# Patient Record
Sex: FEMALE | Race: ASIAN | NOT HISPANIC OR LATINO | Employment: UNEMPLOYED
[De-identification: names, ages, dates, MRNs, and addresses within clinical notes are randomized per-mention and may not be internally consistent; named-entity substitution may affect disease eponyms.]

---

## 2017-01-10 DIAGNOSIS — J45.30 MILD PERSISTENT ASTHMA WITHOUT COMPLICATION: Primary | ICD-10-CM

## 2017-01-10 NOTE — TELEPHONE ENCOUNTER
[facility] phone call message- patient requesting a refill:    Full Medication Name: albuterol    Dose: 108 (90 base)    Pharmacy confirmed as   [id number] - [place], [place] - [address] [address]  Phone: [phone] Fax: [phone]    [id number] - [place], [place] - [address] [place] [address]  Phone: [phone] Fax: [phone]  : Yes    Additional Comments: Patient needs a refill on this.      OK to leave a message on voice mail? Yes    Primary language: English      needed? No    Call taken on January 10, 2017 at 10:56 AM by Kalani Patel

## 2017-01-11 RX ORDER — ALBUTEROL SULFATE 90 UG/1
2 AEROSOL, METERED RESPIRATORY (INHALATION) EVERY 4 HOURS PRN
Qty: 2 INHALER | Refills: 0 | Status: SHIPPED
Start: 2017-01-11 | End: 2019-01-16

## 2018-04-29 ENCOUNTER — HEALTH MAINTENANCE LETTER (OUTPATIENT)
Age: 28
End: 2018-04-29

## 2019-01-16 ENCOUNTER — OFFICE VISIT (OUTPATIENT)
Dept: FAMILY MEDICINE | Facility: CLINIC | Age: 29
End: 2019-01-16
Payer: COMMERCIAL

## 2019-01-16 VITALS
WEIGHT: 150 LBS | SYSTOLIC BLOOD PRESSURE: 125 MMHG | RESPIRATION RATE: 18 BRPM | TEMPERATURE: 98.4 F | BODY MASS INDEX: 30.24 KG/M2 | HEIGHT: 59 IN | OXYGEN SATURATION: 99 % | DIASTOLIC BLOOD PRESSURE: 80 MMHG | HEART RATE: 62 BPM

## 2019-01-16 DIAGNOSIS — J45.30 MILD PERSISTENT ASTHMA WITHOUT COMPLICATION: ICD-10-CM

## 2019-01-16 DIAGNOSIS — R30.0 DYSURIA: Primary | ICD-10-CM

## 2019-01-16 DIAGNOSIS — J45.41 MODERATE PERSISTENT ASTHMA WITH ACUTE EXACERBATION: ICD-10-CM

## 2019-01-16 DIAGNOSIS — J98.01 BRONCHOSPASM: ICD-10-CM

## 2019-01-16 LAB
BACTERIA: NORMAL
BILIRUBIN UR: NEGATIVE
BLOOD UR: ABNORMAL
CASTS: NORMAL /LPF
CLARITY, URINE: CLEAR
COLOR UR: YELLOW
CRYSTAL URINE: NORMAL /LPF
EPITHELIAL CELLS UR: NORMAL /LPF (ref 0–2)
GLUCOSE URINE: NEGATIVE
KETONES UR QL: ABNORMAL
LEUKOCYTE ESTERASE UR: ABNORMAL
MUCOUS URINE: NORMAL LPF
NITRITE UR QL STRIP: NEGATIVE
PH UR STRIP: 6 [PH] (ref 5–7)
PROTEIN UR: NEGATIVE
RBC URINE: NORMAL /HPF
SP GR UR STRIP: 1.01
UROBILINOGEN UR STRIP-ACNC: ABNORMAL
WBC URINE: NORMAL /HPF

## 2019-01-16 RX ORDER — ALBUTEROL SULFATE 0.83 MG/ML
2.5 SOLUTION RESPIRATORY (INHALATION) EVERY 4 HOURS PRN
Qty: 360 ML | Refills: 1 | Status: SHIPPED | OUTPATIENT
Start: 2019-01-16 | End: 2019-03-19

## 2019-01-16 RX ORDER — NITROFURANTOIN 25; 75 MG/1; MG/1
100 CAPSULE ORAL 2 TIMES DAILY
Qty: 14 CAPSULE | Refills: 0 | Status: SHIPPED | OUTPATIENT
Start: 2019-01-16 | End: 2019-03-19

## 2019-01-16 RX ORDER — ALBUTEROL SULFATE 90 UG/1
2 AEROSOL, METERED RESPIRATORY (INHALATION) EVERY 4 HOURS PRN
Qty: 2 INHALER | Refills: 0 | Status: SHIPPED | OUTPATIENT
Start: 2019-01-16 | End: 2019-05-10

## 2019-01-16 SDOH — HEALTH STABILITY: MENTAL HEALTH: HOW OFTEN DO YOU HAVE 6 OR MORE DRINKS ON ONE OCCASION?: LESS THAN MONTHLY

## 2019-01-16 SDOH — HEALTH STABILITY: MENTAL HEALTH: HOW MANY STANDARD DRINKS CONTAINING ALCOHOL DO YOU HAVE ON A TYPICAL DAY?: 1 OR 2

## 2019-01-16 SDOH — HEALTH STABILITY: MENTAL HEALTH: HOW OFTEN DO YOU HAVE A DRINK CONTAINING ALCOHOL?: 2-4 TIMES A MONTH

## 2019-01-16 ASSESSMENT — MIFFLIN-ST. JEOR: SCORE: 1323.15

## 2019-01-16 NOTE — PROGRESS NOTES
HPI:     Piero Villarreal is a 28 year old  female with PMH of moderate persistent asthma who presents with urinary frequency.     Piero Villarreal is here on her own.     Urinary symptoms  Frequent urination x 1 week  Mild dysuria  Cramping pain on both sides of lower abdomen.  Back pain in the low back  No fever, eating and drinking normally  Maybe brown color discharge but that was right after period finished.   LMP Jan 3rd, normal.      Due for asthma follow up:  Controller medication: None currently. Previously prescribed qvar 40 1 puff BID  Albuterol use: ran out.   Triggers: Cold, cats, mold, pollen  Recent ED visits/hospital stays: none  Smokes 3 cigarettes per day. Doesn't think she's ready to quit  Previously used nicotine lozenges to quit- didn't work.   Is not interested in quit plan.   ACT score is 16 today           PMHX:     Patient Active Problem List   Diagnosis     Mild persistent asthma     Pap smear for cervical cancer screening     Prior pregnancy with congenital cardiac defect, antepartum       Current Outpatient Medications   Medication Sig Dispense Refill     albuterol (PROAIR HFA/PROVENTIL HFA/VENTOLIN HFA) 108 (90 Base) MCG/ACT inhaler Inhale 2 puffs into the lungs every 4 hours as needed for shortness of breath / dyspnea or wheezing 2 Inhaler 0     albuterol (PROVENTIL) (2.5 MG/3ML) 0.083% neb solution Take 1 vial (2.5 mg) by nebulization every 4 hours as needed for shortness of breath / dyspnea or wheezing 360 mL 1     beclomethasone HFA (QVAR REDIHALER) 40 MCG/ACT inhaler Inhale 1 puff into the lungs 2 times daily 10.6 g 1     nitroFURantoin macrocrystal-monohydrate (MACROBID) 100 MG capsule Take 1 capsule (100 mg) by mouth 2 times daily for 7 days 14 capsule 0       Social History     Tobacco Use     Smoking status: Current Every Day Smoker     Packs/day: 0.50     Types: Cigarettes     Smokeless tobacco: Never Used   Substance Use Topics     Alcohol use: Yes     Alcohol/week: 0.0 oz      "Frequency: 2-4 times a month     Drinks per session: 1 or 2     Binge frequency: Less than monthly     Comment: ocassionally     Drug use: No       Social History     Social History Narrative     Not on file       Allergies   Allergen Reactions     Cats        Results for orders placed or performed in visit on 01/16/19 (from the past 24 hour(s))   Urinalysis, Micro If ([facility])   Result Value Ref Range    Specific Gravity Urine 1.015 1.005 - 1.030    pH Urine 6.0 4.5 - 8.0    Leukocyte Esterase UR 1+ (A) NEGATIVE    Nitrite Urine Negative NEGATIVE    Protein UR Negative NEGATIVE    Glucose Urine Negative NEGATIVE    Ketones Urine 2+ (A) NEGATIVE    Urobilinogen mg/dL 0.2 E.U./dL 0.2 E.U./dL    Bilirubin UR Negative NEGATIVE    Blood UR 1+ (A) NEGATIVE    Color Urine Yellow     Clarity, urine Clear             Review of Systems:   7 point ROS negative except as noted.           Physical Exam:     Vitals:    01/16/19 1343 01/16/19 1347   BP: 130/86 125/80   Pulse: 62    Resp: 18    Temp: 98.4  F (36.9  C)    TempSrc: Oral    SpO2: 99%    Weight: 68 kg (150 lb)    Height: 1.51 m (4' 11.45\")      Body mass index is 29.84 kg/m .    General: Alert, well-appearing female in NAD  HEENT: PERRL, moist oral mucus membranes  Pulm: CTA BL, no tachypnea  CV: RRR, no murmur  Abd: soft, NTND, no masses, no CVA tenderness  Ext: Warm, well perfused, 2+ BL radial pulses, no LE edema  Skin: No rash on limited skin exam  Psych: Mood appropriate to visit content, full affect, rational thought content and process      Assessment and Plan     1. Dysuria  UA with blood and leuk esterase and symptoms are consistent with UTI. Treat with macrobid.   - Urinalysis, Micro If ([facility])  - nitroFURantoin macrocrystal-monohydrate (MACROBID) 100 MG capsule; Take 1 capsule (100 mg) by mouth 2 times daily for 7 days  Dispense: 14 capsule; Refill: 0    2. Moderate persistent asthma without acute exacerbation  Recommended restarting controller and patient " is agreeable. Discussed controller vs rescue. Recommended smoking cessation, not interested at this time. Filled out new AAP.   - beclomethasone HFA (QVAR REDIHALER) 40 MCG/ACT inhaler; Inhale 1 puff into the lungs 2 times daily  Dispense: 10.6 g; Refill: 1  - albuterol (PROVENTIL) (2.5 MG/3ML) 0.083% neb solution; Take 1 vial (2.5 mg) by nebulization every 4 hours as needed for shortness of breath / dyspnea or wheezing  Dispense: 360 mL; Refill: 1  - albuterol (PROAIR HFA/PROVENTIL HFA/VENTOLIN HFA) 108 (90 Base) MCG/ACT inhaler; Inhale 2 puffs into the lungs every 4 hours as needed for shortness of breath / dyspnea or wheezing  Dispense: 2 Inhaler; Refill: 0        Medications Discontinued During This Encounter   Medication Reason     ciprofloxacin (CIPRO) 500 MG tablet Therapy completed     nicotine polacrilex (NICORELIEF) 4 MG lozenge Stopped by Patient     beclomethasone (QVAR) 40 MCG/ACT Inhaler Reorder     albuterol (2.5 MG/3ML) 0.083% nebulizer solution Reorder     albuterol (PROAIR HFA/PROVENTIL HFA/VENTOLIN HFA) 108 (90 BASE) MCG/ACT Inhaler Reorder       Options for treatment and follow-up care were reviewed with the patient and/or guardian. Piero Villarreal and/or guardian engaged in the decision making process and verbalized understanding of the options discussed and agreed with the final plan.    Leyla Lyn MD  [facility]   Pager: [phone]

## 2019-01-16 NOTE — NURSING NOTE
"Chief Complaint   Patient presents with     Urinary Problem     going more frequently, some lower pelvic pain and lower back pain. Has been going on for about one week.      Medication Reconciliation     Ibuprofen as needed for pain .     Asthma     has been out of inhalers for about 2 months now. Due for ACT and AAP and refills.        /86   Pulse 62   Temp 98.4  F (36.9  C) (Oral)   Resp 18   Ht 1.51 m (4' 11.45\")   Wt 68 kg (150 lb)   LMP 01/03/2019 (Exact Date)   SpO2 99%   BMI 29.84 kg/m    BP recheck: 125/80  "

## 2019-01-16 NOTE — PATIENT INSTRUCTIONS
My Asthma Action Plan  Name: Piero Villarreal  YOB: 1990  Date: 1/16/2019   My doctor: Drew Kerns   My clinic:   [facility] [address] [place] [address]  [phone]    My Asthma Severity: moderate persistent Avoid your asthma triggers: dust mites, pollens, animal dander, strong odors and fumes, exercise or sports and cold air      GREEN ZONE   Good Control    I feel good    No cough or wheeze    Can work, sleep and play without asthma symptoms       Take your asthma control medicine every day.  Take the medications listed below daily.    QVAR  40 mcg 1 puff twice a day    1. If exercise triggers your asthma, take your rescue medication (2 puffs of albuterol, Ventolin/Pro-Air) 15 minutes before exercise or sports, and during exercise if you have asthma symptoms.  2. Spacer to use with inhaler: If you have a spacer, make sure to use it with your inhaler.              YELLOW ZONE Getting Worse  I have ANY of these:    I do not feel good    Cough or wheeze    Chest feels tight    Wake up at night   1. Keep taking your Green Zone medications.  2. Start taking your rescue medicine (1-2 puffs of albuterol - Ventolin/Pro-Air) every 4-6 hours as needed.  3. If symptoms are not controlled with above, can take 2 puffs every 20 minutes for up to 1 hour, then continue every 4 hours if needed.   4. If you do not return to the Green Zone in 12-24 hours or you get worse, call the clinic.         RED ZONE Medical Alert - Get Help  I have ANY of these:    I feel awful    Medicine is not helping    Breathing getting harder    Trouble walking or talking    Nose opens wide to breathe       1. Take your rescue medicine NOW (6-8 puffs of albuterol - Ventolin/Pro-Air) for every 20 minutes for up to 1 hour.  2. Call your doctor NOW.  3. If you are still in the Red Zone after 20 minutes and you have not reached your doctor:    Take your rescue medicine again (6-8 puffs of albuterol -  Ventolin/Pro-Air) and    Call 911 or go to the emergency room right away    See your regular doctor within 1 weeks of an Emergency Room or Urgent Care visit for follow-up treatment.        This Asthma Action Plan provides authorization for the administration of medication described in the AAP.  YES      Electronically signed by: Leyla Lyn    Annual Reminders:  Meet with Asthma Educator,  Flu Shot in the Fall, Pneumonia Shot  Pharmacy: [facility] [address] [address]

## 2019-01-17 ASSESSMENT — ASTHMA QUESTIONNAIRES: ACT_TOTALSCORE: 16

## 2019-01-17 NOTE — RESULT ENCOUNTER NOTE
The patient was notified of this result in clinic.    RBCs on micro are likely secondary to acute infection but would recommend repeat UA when well to ensure this resolves.     Leyla Lyn MD  Family Medicine

## 2019-02-19 ENCOUNTER — RECORDS - HEALTHEAST (OUTPATIENT)
Dept: ADMINISTRATIVE | Facility: OTHER | Age: 29
End: 2019-02-19

## 2019-02-19 ENCOUNTER — OFFICE VISIT (OUTPATIENT)
Dept: FAMILY MEDICINE | Facility: CLINIC | Age: 29
End: 2019-02-19
Payer: MEDICAID

## 2019-02-19 VITALS
HEIGHT: 59 IN | WEIGHT: 142.4 LBS | BODY MASS INDEX: 28.71 KG/M2 | RESPIRATION RATE: 16 BRPM | TEMPERATURE: 98.1 F | DIASTOLIC BLOOD PRESSURE: 86 MMHG | HEART RATE: 61 BPM | OXYGEN SATURATION: 99 % | SYSTOLIC BLOOD PRESSURE: 126 MMHG

## 2019-02-19 DIAGNOSIS — F17.200 TOBACCO USE DISORDER: ICD-10-CM

## 2019-02-19 DIAGNOSIS — F43.22 ADJUSTMENT DISORDER WITH ANXIOUS MOOD: Primary | ICD-10-CM

## 2019-02-19 DIAGNOSIS — R00.2 PALPITATIONS: ICD-10-CM

## 2019-02-19 ASSESSMENT — MIFFLIN-ST. JEOR: SCORE: 1288.67

## 2019-02-19 NOTE — PATIENT INSTRUCTIONS
Referral for echocardiogram faxed to [facility]  [phone]  [phone]  Patient will be contacted to schedule appointment.

## 2019-02-19 NOTE — PROGRESS NOTES
"Preceptor Attestation:  {Attestation choices [id number]::\" Patient seen, evaluated and discussed with the resident.\"} I have verified the content of the note, which accurately reflects my assessment of the patient and the plan of care.  Supervising Physician:Allen Reinoso MD  [facility]    Quite anxious about work situationand referred for this.  Asthma adequately controlled with current meds.        "

## 2019-02-21 ASSESSMENT — ANXIETY QUESTIONNAIRES
3. WORRYING TOO MUCH ABOUT DIFFERENT THINGS: NEARLY EVERY DAY
2. NOT BEING ABLE TO STOP OR CONTROL WORRYING: NEARLY EVERY DAY
6. BECOMING EASILY ANNOYED OR IRRITABLE: SEVERAL DAYS
5. BEING SO RESTLESS THAT IT IS HARD TO SIT STILL: MORE THAN HALF THE DAYS
1. FEELING NERVOUS, ANXIOUS, OR ON EDGE: NEARLY EVERY DAY
IF YOU CHECKED OFF ANY PROBLEMS ON THIS QUESTIONNAIRE, HOW DIFFICULT HAVE THESE PROBLEMS MADE IT FOR YOU TO DO YOUR WORK, TAKE CARE OF THINGS AT HOME, OR GET ALONG WITH OTHER PEOPLE: VERY DIFFICULT
GAD7 TOTAL SCORE: 15
7. FEELING AFRAID AS IF SOMETHING AWFUL MIGHT HAPPEN: SEVERAL DAYS

## 2019-02-21 ASSESSMENT — PATIENT HEALTH QUESTIONNAIRE - PHQ9
5. POOR APPETITE OR OVEREATING: MORE THAN HALF THE DAYS
SUM OF ALL RESPONSES TO PHQ QUESTIONS 1-9: 18

## 2019-02-22 ASSESSMENT — ANXIETY QUESTIONNAIRES: GAD7 TOTAL SCORE: 15

## 2019-02-24 PROBLEM — R00.2 PALPITATIONS: Status: ACTIVE | Noted: 2019-02-24

## 2019-02-24 PROBLEM — F43.22 ADJUSTMENT DISORDER WITH ANXIOUS MOOD: Status: ACTIVE | Noted: 2019-02-24

## 2019-02-24 PROBLEM — F17.200 TOBACCO USE DISORDER: Status: ACTIVE | Noted: 2019-02-24

## 2019-02-24 NOTE — PROGRESS NOTES
SUBJECTIVE:                                                    Piero Villarreal is a 28 year old year old female who presents to clinic today for the following health issues:    Anxiety/Mood:  Patient reports symptoms of labile and depressed mood.  She states that her symptoms are mainly related to work.  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  Apparently, there have been instances at work where she is gotten in trouble for use of her phone, dress, and other mild defenses.  1 of her current coworkers seems to have information from human resources she should not and is very prejudiced towards her and starting rumors making her feel upset, anxious about her interactions with other employees, and that she has not trusted at work.  She has not yet contacted human resources about this.  She does not feel physically threatened and feels safe at home and work.  She denies any symptoms of suicidal ideation and would never harm herself.  She states her home life is good.    She does smoke approximately 1/2 pack/day.    Additionally, patient ha been seen in the emergency department for symptoms of facial tingling and palpitations in the past.  As recently as 9/28/2018 she was seen at [facility] emergency department and recommended follow-up with neurology.  She has not done this and her symptoms have resolved.    Review of records from 7/1/2016 with [facility] cardiologist Dr. Willy Castro shows she had episodes of palpitations and a normal TSH.  He recommended an echocardiogram and Holter monitor at that time.  She has not completed either.  She does note palpitations with her anxiety at work.  She denies any chest pain, lightheadedness, shortness of breath.    She would like to speak to a counselor and will defer on pharmacological treatment at this time.      PHQ-9 SCORE 2/21/2019   PHQ-9 Total Score 18     GAD7 score: 15      Patient is established patient of this  clinic.  ----------------------------------------------------------------------------------------------------------------------  Patient Active Problem List   Diagnosis     Mild persistent asthma     Pap smear for cervical cancer screening     Prior pregnancy with congenital cardiac defect, antepartum     Past Surgical History:   Procedure Laterality Date     NO HISTORY OF SURGERY         Social History     Tobacco Use     Smoking status: Current Every Day Smoker     Packs/day: 0.50     Types: Cigarettes     Smokeless tobacco: Never Used   Substance Use Topics     Alcohol use: Yes     Alcohol/week: 0.0 oz     Frequency: 2-4 times a month     Drinks per session: 1 or 2     Binge frequency: Less than monthly     Comment: ocassionally     Family History   Problem Relation Age of Onset     Asthma Son      Diabetes No family hx of      Coronary Artery Disease No family hx of      Hypertension No family hx of      Breast Cancer No family hx of      Cancer - colorectal No family hx of      Ovarian Cancer No family hx of      Prostate Cancer No family hx of      Other Cancer No family hx of      Mental Illness No family hx of      Depression/Anxiety No family hx of      Cerebrovascular Disease No family hx of      Thyroid Disease No family hx of      Obesity No family hx of      Thyroid Disease No family hx of      Genetic Disorder No family hx of      Osteoporosis No family hx of      Anesthesia Reaction No family hx of      Substance Abuse No family hx of      Mental Illness No family hx of      Anxiety Disorder No family hx of      Depression No family hx of      Colon Cancer No family hx of      Hyperlipidemia No family hx of          Problem list and past medical, surgical, social, and family histories reviewed & adjusted, as indicated.    Current Outpatient Medications   Medication Sig Dispense Refill     albuterol (PROAIR HFA/PROVENTIL HFA/VENTOLIN HFA) 108 (90 Base) MCG/ACT inhaler Inhale 2 puffs into the lungs  "every 4 hours as needed for shortness of breath / dyspnea or wheezing 2 Inhaler 0     albuterol (PROVENTIL) (2.5 MG/3ML) 0.083% neb solution Take 1 vial (2.5 mg) by nebulization every 4 hours as needed for shortness of breath / dyspnea or wheezing 360 mL 1     beclomethasone HFA (QVAR REDIHALER) 40 MCG/ACT inhaler Inhale 1 puff into the lungs 2 times daily 10.6 g 1     Medication list reviewed and updated as indicated.    Allergies   Allergen Reactions     Cats      Allergies reviewed and updated as indicated.  ----------------------------------------------------------------------------------------------------------------------  ROS:  Constitutional, HEENT, cardiovascular, pulmonary, GI, musculoskeletal, neuro, skin, and psych systems are negative, except as otherwise noted     OBJECTIVE:     /86   Pulse 61   Temp 98.1  F (36.7  C) (Oral)   Resp 16   Ht 1.51 m (4' 11.45\")   Wt 64.6 kg (142 lb 6.4 oz)   LMP 02/01/2019 (Approximate)   SpO2 99%   BMI 28.33 kg/m    Body mass index is 28.33 kg/m .  General: Appears well and in no acute distress.  Cardiovascular: Regular rate and rhythm, normal S1 and S2 without murmur. No extra heartsounds or friction rub. Radial pulses present and equal bilaterally.  Respiratory: Lungs clear to auscultation bilaterally. No wheezing or crackles. No prolonged expiration. Symmetrical chest rise.  Musculoskeletal: No gross extremity deformities. No peripheral edema. Normal muscle bulk.    Diagnostic Test Results:  Await Echo results    ASSESSMENT/PLAN:     1. Adjustment disorder with anxious mood: Anxiety and mood changes seem to revolve around only her work and she is happy and other areas of her life.  Recommend discussing this with the human resource department at her work and will place a mental health referral for her to see a therapist.  Follow-up within 1 month.  - MENTAL HEALTH REFERRAL  -    2. Palpitations likely related to anxiety; however,: We will obtain " echocardiogram and consider Holter monitor as previously recommended by cardiology.  Follow-up in 1 month and obtain echocardiogram to review results at visit.  - Echocardiogram Complete    Schedule follow-up appointment in 1 month.    There are no discontinued medications.    Drew Kerns MD  [facility] Resident  Pager# [phone]    Precepted with: Allen Reinoso MD    Options for treatment and follow-up care were reviewed with the patient and/or guardian. Pierosebastian AlbertPhil and/or guardian engaged in the decision making process and verbalized understanding of the options discussed and agreed with the final plan    This chart is completed utilizing dictation software; typos and/or incorrect word substitutions may unintentionally occur.

## 2019-02-25 ENCOUNTER — DOCUMENTATION ONLY (OUTPATIENT)
Dept: PSYCHOLOGY | Facility: CLINIC | Age: 29
End: 2019-02-25

## 2019-02-25 NOTE — PROGRESS NOTES
Patient is having anxiety that is related to work stressors. Unclear what her work schedule is, but to offer her the most flexibility for appointments she can be scheduled at the following:    [facility]  [address] [place] [address]  [phone] Phone  [phone] Fax  M-Th 8-8pm  F 8-4:30pm    [facility]  [address] [place] [address] [address]  [place] [address]  [phone] Phone  [phone] Fax  M-F 7:30AM-7PM  Saturday 8AM-2PM    [facility]  [address] [place] [address]  [phone] Phone  [phone] Fax  Monday-Friday: 9am -9pm  : 9am- 2pm          ===View-only below this line===    ----- Message -----  From: Domenico Kerns MD  Sent: 2019  12:08 PM  To:  Mental Health  Subject: Order for AMINA FAIRBANKS                 [id number]               : 1990  F     [address] [place] [address]                                  PCP: [id number], DO*  [place] [address]                                CTR: [facility]        Name: AMINA FAIRBANKS Date: 2019    Home: [phone]    Payor:              MEDICA  Plan:                 MEDICA CHOICE  Sponsor Code:       [id number]  Subscriber ID:      [id number]  Subscriber Name:    AMINA FAIRBANKS  Subscriber Address: [address] [place] [address]    Effective From:     19  Effective To:         Group Number:       [id number]  Group Name  : Not Available      Date       Provider                   Department   Center         2019  [id number]-MNVOY-GTESNCDOMENICO KERNS*[facility] Owned    Order Date:    Ordering User:DOMENICO KERNS [[email or web address]]  Encounter Provider:Domenico Kerns MD [[id number]]  Authorizing Provider: Allen Reinoso MD [[id number]]  Department:[facility][[id number]]    Ordering Provider NPI: [id number]  Allen Reinoso  [facility]~[address] [facility]  [place] [facility] [address]  Phone: [phone]        Procedure Requested    9035     MENTAL HEALTH RE  DELORIS  -             [#[id number]]      Priority: Routine  Class: External referral      Comment:Use this form for behavioral health consults and assessments. The               referral coordinator will help to determine whether patients are               best served by clinic behavioral health staff or by community               providers.                              Type of referral(s) requested (indicate all that apply):                 Adult Psychotherapy--for diagnosis and non-pharmacological                treatment                              Reason for referral: anxiety                              Currently receiving mental health services (if 'Yes', what                services and why today's referral?): No               Currently having suicidal thoughts: No               Previous psych hospitalization: No                              Please provide data for below screening tools if available.                PHQ-9 Score: 18               GAD7 Score: 15               PC-PTSD Score: 2                               needed: No               Language: English    Associated Diagnoses      F43.22 Adjustment disorder with anxious mood      Adult or Child/Adolescent:  Adult         Location:  [facility]                 [id number]               : 1990  F     [address]                                  PCP: KEELEY, DO*  [place] [address]                                CTR: [facility]      Comments

## 2019-02-27 ENCOUNTER — TELEPHONE (OUTPATIENT)
Dept: FAMILY MEDICINE | Facility: CLINIC | Age: 29
End: 2019-02-27

## 2019-02-27 NOTE — PROGRESS NOTES
Preceptor Attestation:   Patient seen, evaluated and discussed with the resident. I have verified the content of the note, which accurately reflects my assessment of the patient and the plan of care.  Supervising Physician:Allen Reinoso MD  [facility]    Quite anxious about work situationand referred for this.  Asthma adequately controlled with current meds

## 2019-03-01 NOTE — TELEPHONE ENCOUNTER
Piero has been scheduled with [facility] 3/6/19 @ 2:00 w/ Bill.  Referral, face sheet and most recent clinic visit note faxed/confirmed to [phone] ([facility]).  Out going call made to Piero, per patient request a detailed message was left on her voicemail with Clinic name, address, phone number, date and time.

## 2019-03-01 NOTE — TELEPHONE ENCOUNTER
Incoming call received from [facility], patient has requested I schedule ASAP.

## 2019-03-19 ENCOUNTER — OFFICE VISIT (OUTPATIENT)
Dept: FAMILY MEDICINE | Facility: CLINIC | Age: 29
End: 2019-03-19
Payer: MEDICAID

## 2019-03-19 VITALS
WEIGHT: 139 LBS | SYSTOLIC BLOOD PRESSURE: 125 MMHG | DIASTOLIC BLOOD PRESSURE: 85 MMHG | TEMPERATURE: 98 F | HEIGHT: 60 IN | BODY MASS INDEX: 27.29 KG/M2 | HEART RATE: 63 BPM | RESPIRATION RATE: 16 BRPM | OXYGEN SATURATION: 100 %

## 2019-03-19 DIAGNOSIS — R00.2 PALPITATIONS: ICD-10-CM

## 2019-03-19 DIAGNOSIS — F43.22 ADJUSTMENT DISORDER WITH ANXIOUS MOOD: Primary | ICD-10-CM

## 2019-03-19 ASSESSMENT — PATIENT HEALTH QUESTIONNAIRE - PHQ9: SUM OF ALL RESPONSES TO PHQ QUESTIONS 1-9: 21

## 2019-03-19 ASSESSMENT — MIFFLIN-ST. JEOR: SCORE: 1274.06

## 2019-03-19 NOTE — PROGRESS NOTES
"  SUBJECTIVE:                                                    Piero Villarreal is a 28 year old year old female who presents to clinic today for the following health issues:    Anxiety/Mood:  Patient is here today to follow-up on mood/anxiety symptoms. Believes they are improving. First started in August of 2018. She has began seeing a therapist (Kennedy Hutchinson) 2 times weekly for 1 hour sessions. This is not close to work and causes her to miss half days here and their. Her PHQ-9 today is 21. No SI. Thinks thinks are improving and she likes her therapist. Requesting FMLA forms to be filled out today given number of appointments she has weekly. She declines pharmacotherapy. For more information regarding her initial presentation, see below.    \"symptoms of labile and depressed mood.  She states that her symptoms are mainly related to work.  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  Apparently, there have been instances at work where she is gotten in trouble for use of her phone, dress, and other mild defenses.  1 of her current coworkers seems to have information from human resources she should not and is very prejudiced towards her and starting rumors making her feel upset, anxious about her interactions with other employees, and that she has not trusted at work.  She has not yet contacted human resources about this.  She does not feel physically threatened and feels safe at home and work.  She denies any symptoms of suicidal ideation and would never harm herself.  She states her home life is good.    She does smoke approximately 1/2 pack/day.    Additionally, patient ha been seen in the emergency department for symptoms of facial tingling and palpitations in the past.  As recently as 9/28/2018 she was seen at [facility] emergency department and recommended follow-up with neurology.  She has not done this and her symptoms have resolved.    Review of records from " "7/1/2016 with [facility] cardiologist Dr. Willy Castro shows she had episodes of palpitations and a normal TSH.  He recommended an echocardiogram and Holter monitor at that time.  She has not completed either.  She does note palpitations with her anxiety at work.  She denies any chest pain, lightheadedness, shortness of breath.\"      PHQ-9 SCORE 3/19/2019   PHQ-9 Total Score 21     Patient is established patient of this clinic.  ----------------------------------------------------------------------------------------------------------------------  Patient Active Problem List   Diagnosis     Mild persistent asthma     Pap smear for cervical cancer screening     Prior pregnancy with congenital cardiac defect, antepartum     Tobacco use disorder     Adjustment disorder with anxious mood     Palpitations     Past Surgical History:   Procedure Laterality Date     NO HISTORY OF SURGERY         Social History     Tobacco Use     Smoking status: Current Every Day Smoker     Packs/day: 0.50     Types: Cigarettes     Smokeless tobacco: Never Used   Substance Use Topics     Alcohol use: Yes     Alcohol/week: 0.0 oz     Frequency: 2-4 times a month     Drinks per session: 1 or 2     Binge frequency: Less than monthly     Comment: ocassionally     Family History   Problem Relation Age of Onset     Asthma Son      Diabetes No family hx of      Coronary Artery Disease No family hx of      Hypertension No family hx of      Breast Cancer No family hx of      Cancer - colorectal No family hx of      Ovarian Cancer No family hx of      Prostate Cancer No family hx of      Other Cancer No family hx of      Mental Illness No family hx of      Depression/Anxiety No family hx of      Cerebrovascular Disease No family hx of      Thyroid Disease No family hx of      Obesity No family hx of      Thyroid Disease No family hx of      Genetic Disorder No family hx of      Osteoporosis No family hx of      Anesthesia Reaction No family hx of      " "Substance Abuse No family hx of      Mental Illness No family hx of      Anxiety Disorder No family hx of      Depression No family hx of      Colon Cancer No family hx of      Hyperlipidemia No family hx of          Problem list and past medical, surgical, social, and family histories reviewed & adjusted, as indicated.    Current Outpatient Medications   Medication Sig Dispense Refill     albuterol (PROAIR HFA/PROVENTIL HFA/VENTOLIN HFA) 108 (90 Base) MCG/ACT inhaler Inhale 2 puffs into the lungs every 4 hours as needed for shortness of breath / dyspnea or wheezing 2 Inhaler 0     beclomethasone HFA (QVAR REDIHALER) 40 MCG/ACT inhaler Inhale 1 puff into the lungs 2 times daily 10.6 g 1     Medication list reviewed and updated as indicated.    Allergies   Allergen Reactions     Cats      Allergies reviewed and updated as indicated.  ----------------------------------------------------------------------------------------------------------------------  ROS:  Constitutional, HEENT, cardiovascular, pulmonary, GI, musculoskeletal, neuro, skin, and psych systems are negative, except as otherwise noted     OBJECTIVE:     /85   Pulse 63   Temp 98  F (36.7  C) (Oral)   Resp 16   Ht 1.511 m (4' 11.5\")   Wt 63 kg (139 lb)   SpO2 100%   BMI 27.60 kg/m    Body mass index is 27.6 kg/m .  General: Appears well and in no acute distress.  Cardiovascular: Regular rate and rhythm, normal S1 and S2 without murmur. No extra heartsounds or friction rub. Radial pulses present and equal bilaterally.  Respiratory: Lungs clear to auscultation bilaterally. No wheezing or crackles. No prolonged expiration. Symmetrical chest rise.  Musculoskeletal: No gross extremity deformities. No peripheral edema. Normal muscle bulk.    Diagnostic Test Results:  Await Echo results    ASSESSMENT/PLAN:     1. Adjustment disorder with anxious mood: Improving. States she is seeing therapist. Request records. Anxiety and mood changes seem to revolve " around only her work and she is happy and other areas of her life.  Paperwork filled out. Declines pharmacotherapy.  Follow-up within 1-2 months month.    2. Palpitations Likely related to anxiety. Has not yet obtained echocardiogram.  Also consider Holter monitor as previously recommended by cardiology.  Follow-up in 1 month and obtain echocardiogram to review results at visit.  - Echocardiogram Complete    Schedule follow-up appointment in 1-2 months.    Medications Discontinued During This Encounter   Medication Reason     albuterol (PROVENTIL) (2.5 MG/3ML) 0.083% neb solution Medication Reconciliation Clean Up     nitroFURantoin macrocrystal-monohydrate (MACROBID) 100 MG capsule Medication Reconciliation Clean Up       Drew Kerns MD  [facility] [place] [facility]  Pager# [phone]    Precepted with: Aleida Carpenter MD    Options for treatment and follow-up care were reviewed with the patient and/or guardian. Piero Villarreal and/or guardian engaged in the decision making process and verbalized understanding of the options discussed and agreed with the final plan    This chart is completed utilizing dictation software; typos and/or incorrect word substitutions may unintentionally occur.

## 2019-03-20 ASSESSMENT — ASTHMA QUESTIONNAIRES: ACT_TOTALSCORE: 17

## 2019-04-10 ENCOUNTER — OFFICE VISIT (OUTPATIENT)
Dept: FAMILY MEDICINE | Facility: CLINIC | Age: 29
End: 2019-04-10
Payer: MEDICAID

## 2019-04-10 VITALS
WEIGHT: 137 LBS | TEMPERATURE: 98.4 F | HEART RATE: 60 BPM | HEIGHT: 60 IN | SYSTOLIC BLOOD PRESSURE: 110 MMHG | DIASTOLIC BLOOD PRESSURE: 77 MMHG | RESPIRATION RATE: 16 BRPM | BODY MASS INDEX: 26.9 KG/M2

## 2019-04-10 DIAGNOSIS — N91.2 AMENORRHEA: Primary | ICD-10-CM

## 2019-04-10 LAB — HCG UR QL: POSITIVE

## 2019-04-10 RX ORDER — PNV NO.95/FERROUS FUM/FOLIC AC 28MG-0.8MG
1 TABLET ORAL DAILY
Qty: 30 TABLET | Refills: 8 | Status: SHIPPED | OUTPATIENT
Start: 2019-04-10 | End: 2019-05-14

## 2019-04-10 ASSESSMENT — MIFFLIN-ST. JEOR: SCORE: 1267.31

## 2019-04-10 NOTE — NURSING NOTE
"Chief Complaint   Patient presents with     Pregnancy Test     + one at home yesterday. Unplanned but planning to keep pregnancy     Medication Reconciliation     completed       /77   Pulse 60   Temp 98.4  F (36.9  C) (Oral)   Resp 16   Ht 1.515 m (4' 11.65\")   Wt 62.1 kg (137 lb)   LMP 03/04/2019 (Exact Date)   BMI 27.07 kg/m        No changes in medical/surgical history since last visit.  ---BHAVYA Arizmendi      "

## 2019-04-10 NOTE — PROGRESS NOTES
Pt is a 28 year old female last seen on 3/19/19 by Dr Vargas-[facility] here today for:     Pregnancy confirmation   LMP : 3/4/19  GURJIT: 19  Today is 5w 2d  Complications in pregnancy    - healthy son; no complications   - termination of twins due to congenital heart and lung issues   2016 - maternal HTN, shoulder dystocia    Mood: feeling more sad -> as things get worse at work  Has anxiety assoc mostly w/ work; is seeking new work setting  Seeing counselor on average about weekly  Does not want to take medication  Unsure if this pregnancy will be stressful or helpful for mood    PHQ-9 SCORE 2019 3/19/2019   PHQ-9 Total Score 18 21   Today  - 22    Asthma - stable on beclomethasone    Past Medical History:   Diagnosis Date     Bacterial vaginosis 2013     Liver mass, right lobe     6 mm, seen on US on , likely hemangioma     Monochorionic diamniotic twin gestation     cardiac abnormalities in Twin A, chest mass in Twin B,  at 21 weeks     Pelvic pain      Uncomplicated asthma     pt only gets flare ups when ill      Past Surgical History:   Procedure Laterality Date     NO HISTORY OF SURGERY        Current Outpatient Medications   Medication Sig Dispense Refill     albuterol (PROAIR HFA/PROVENTIL HFA/VENTOLIN HFA) 108 (90 Base) MCG/ACT inhaler Inhale 2 puffs into the lungs every 4 hours as needed for shortness of breath / dyspnea or wheezing 2 Inhaler 0     beclomethasone HFA (QVAR REDIHALER) 40 MCG/ACT inhaler Inhale 1 puff into the lungs 2 times daily 10.6 g 1     Prenatal Vit-Fe Fumarate-FA (PRENATAL VITAMIN) 27-0.8 MG TABS Take 1 tablet by mouth daily 30 tablet 8      Allergies   Allergen Reactions     Cats         ROS:   Gen- no weight change, no fevers/chills   Head/ Eyes- no blurred vision, no headaches   ENT- no cough, no congestion, no URI symptoms   Cardiac - no palpitations, no chest pain   Respiratory - no shortness of breath , no wheezing   GI - no nausea, no vomiting,  "no diarrhea, no constipation   Urinary - no dysuria, no polyuria   Neuro - no numbness, no tingling   Remainder of ROS negative.     Exam:   /77   Pulse 60   Temp 98.4  F (36.9  C) (Oral)   Resp 16   Ht 1.515 m (4' 11.65\")   Wt 62.1 kg (137 lb)   LMP 03/04/2019 (Exact Date)   BMI 27.07 kg/m     Alert and oriented x 3; No acute distress   Neuro: no focal deficits         (N91.2) Amenorrhea  (primary encounter diagnosis)  Comment: reviewed chart w/ Monica who will help setup initial OB visit; pt not interested in early genetic testing so we will hold on referral to OB til more formal discussion at her initial OB visit  Plan: HCG Qualitative Urine (UPT)  ([facility]), Prenatal        Vit-Fe Fumarate-FA (PRENATAL VITAMIN) 27-0.8 MG        GORDOS            Gideon Vallejo MD  April 11, 2019  7:32 PM          "

## 2019-04-15 NOTE — PROGRESS NOTES
Preceptor Attestation:  Patient's case reviewed and discussed with  Patient seen and discussed with the resident..  I agree with written assessment and plan of care.  Supervising Physician:  Batsheva Carpenter MD  [facility]

## 2019-04-18 ENCOUNTER — OFFICE VISIT (OUTPATIENT)
Dept: FAMILY MEDICINE | Facility: CLINIC | Age: 29
End: 2019-04-18
Payer: MEDICAID

## 2019-04-18 ENCOUNTER — CARE COORDINATION (OUTPATIENT)
Dept: FAMILY MEDICINE | Facility: CLINIC | Age: 29
End: 2019-04-18

## 2019-04-18 VITALS
TEMPERATURE: 97.9 F | OXYGEN SATURATION: 99 % | HEART RATE: 62 BPM | RESPIRATION RATE: 20 BRPM | WEIGHT: 141 LBS | HEIGHT: 60 IN | DIASTOLIC BLOOD PRESSURE: 75 MMHG | SYSTOLIC BLOOD PRESSURE: 116 MMHG | BODY MASS INDEX: 27.68 KG/M2

## 2019-04-18 DIAGNOSIS — Z34.81 ENCOUNTER FOR SUPERVISION OF OTHER NORMAL PREGNANCY IN FIRST TRIMESTER: ICD-10-CM

## 2019-04-18 DIAGNOSIS — N89.8 VAGINAL DISCHARGE: Primary | ICD-10-CM

## 2019-04-18 LAB
BACTERIA: NORMAL
CLUE CELLS: NORMAL
MOTILE TRICHOMONAS: NEGATIVE
ODOR: NEGATIVE
PH WET PREP: NORMAL (ref 3.8–4.5)
WBC WET PREP: NORMAL (ref 2–5)
YEAST: NEGATIVE

## 2019-04-18 ASSESSMENT — PATIENT HEALTH QUESTIONNAIRE - PHQ9
SUM OF ALL RESPONSES TO PHQ QUESTIONS 1-9: 12
5. POOR APPETITE OR OVEREATING: SEVERAL DAYS

## 2019-04-18 ASSESSMENT — ANXIETY QUESTIONNAIRES
5. BEING SO RESTLESS THAT IT IS HARD TO SIT STILL: SEVERAL DAYS
1. FEELING NERVOUS, ANXIOUS, OR ON EDGE: SEVERAL DAYS
3. WORRYING TOO MUCH ABOUT DIFFERENT THINGS: SEVERAL DAYS
7. FEELING AFRAID AS IF SOMETHING AWFUL MIGHT HAPPEN: SEVERAL DAYS
IF YOU CHECKED OFF ANY PROBLEMS ON THIS QUESTIONNAIRE, HOW DIFFICULT HAVE THESE PROBLEMS MADE IT FOR YOU TO DO YOUR WORK, TAKE CARE OF THINGS AT HOME, OR GET ALONG WITH OTHER PEOPLE: SOMEWHAT DIFFICULT
2. NOT BEING ABLE TO STOP OR CONTROL WORRYING: SEVERAL DAYS
GAD7 TOTAL SCORE: 6
6. BECOMING EASILY ANNOYED OR IRRITABLE: NOT AT ALL

## 2019-04-18 ASSESSMENT — MIFFLIN-ST. JEOR: SCORE: 1288.58

## 2019-04-18 NOTE — LETTER
April 19, 2019      Piero Villarreal  [address] [place] [address]        Dear Piero,    Please see below for your test results.    Resulted Orders   Wet Prep ([facility])   Result Value Ref Range    Yeast Wet Prep NEGATIVE none    Motile Trichomonas Wet Prep NEGATIVE Negative    Clue Cells Wet Prep Present <20% NONE    WBC WET PREP 10-25 2 - 5    Bacteria Wet Prep Many None    pH Wet Prep Not performed 3.8 - 4.5    Odor Wet Prep NEGATIVE NONE   Chlamydia/Gono Amplified ([facility])   Result Value Ref Range    Chlamydia trac,Amplified Prb Negative Negative    N gonorrhoeae,Amplified Prb Negative Negative    Narrative    Test performed by:  [facility] [address] [place] [address]     Please inform patient that wet prep shows bacterial overgrowth of the vagina which may be causing cramping discomfort. [facility] topical [facility] [place]. Gonorrhea and chlamydia test was negative.    Please don't forget to come back for lab only appointment for first OB labs, this is very important!       If you have any questions, please call the clinic to make an appointment.    Sincerely,    Magali Borja MD

## 2019-04-18 NOTE — PROGRESS NOTES
Piero Villarreal is a 28 year old  female who presents to clinic for a new OB visit.  Her last menstrual period was 3/4/19, irregular sometimes skips a months.    Estimated Date of Delivery: 19 via LMP    She has not had bleeding since her LMP. She has not had any abdominal pain or cramping since her LMP. She has had nausea resulting in non-bilious and non-bloody Weigh loss has occurred, for a total of 4 but now is back to baseline weight pounds. This was not a planned pregnancy.     Previous pregnancy with GDM, induced early and had a shoulder dystocia. Previous twin pregnancy termination due to fetal defects.     OTHER CONCERNS:   Cramping     Pre Term Labor Risk Assessment  Is the patient's age <18 or >40? No  Patint's BMI is Body mass index is 27.68 kg/m .. Does patient have a BMI < 18.5? No  If previous pregnancy, was delivery within previous 6 months? No  Have you ever delivered a baby prior to 37 weeks gestation? No  Did conception for this pregnancy occur via In Vitro Fertilization? No  Summary: Patient is not high risk for  Labor for  labor.    Patient Active Problem List   Diagnosis     Mild persistent asthma     Pap smear for cervical cancer screening     Prior pregnancy with congenital cardiac defect, antepartum     Tobacco use disorder     Adjustment disorder with anxious mood     Palpitations       OB History    Para Term  AB Living   4 2 2 0 1 2   SAB TAB Ectopic Multiple Live Births   0 1 0 1 2      # Outcome Date GA Lbr Amrit/2nd Weight Sex Delivery Anes PTL Lv   4 Current            3 Term 16 39w2d  3.175 kg (7 lb) M  EPI N CLAU      Birth Comments: GDM with this pregnancy,diet controlled. Had shoulder dystocia at delivery.      Name: Viktor Allen   2A TAB 10/2013 21w0d          2B TAB            1 Term 09 40w0d  3.175 kg (7 lb) M  EPI N CLAU      Birth Comments: informed at risk pre-eclampsia      Complications: Preeclampsia/Hypertension      Name: Edwin  Tiffany      Obstetric Comments   Elective  at 21 weeks in 2013, mono/di twins, cardiac anomalies in Twin A, left chest mass with left sided heart in Twin B       Past Medical History:   Diagnosis Date     Bacterial vaginosis 2013     Liver mass, right lobe     6 mm, seen on US on , likely hemangioma     Monochorionic diamniotic twin gestation     cardiac abnormalities in Twin A, chest mass in Twin B,  at 21 weeks     Pelvic pain      Uncomplicated asthma     pt only gets flare ups when ill       Past Surgical History:   Procedure Laterality Date     NO HISTORY OF SURGERY         Current Outpatient Medications   Medication Sig Dispense Refill     albuterol (PROAIR HFA/PROVENTIL HFA/VENTOLIN HFA) 108 (90 Base) MCG/ACT inhaler Inhale 2 puffs into the lungs every 4 hours as needed for shortness of breath / dyspnea or wheezing 2 Inhaler 0     beclomethasone HFA (QVAR REDIHALER) 40 MCG/ACT inhaler Inhale 1 puff into the lungs 2 times daily 10.6 g 1     Prenatal Vit-Fe Fumarate-FA (PRENATAL VITAMIN) 27-0.8 MG TABS Take 1 tablet by mouth daily 30 tablet 8       Do you have a history of any of the following medical conditions?  Condition Yes/No   Hypertension No   Heart disease, mitral valve prolapse, rheumatic fever No   Asthma or another chronic lung disease YES   An autoimmune disorder No   Kidney disease No   Frequent urinary tract infections YES   Migraine headaches No   Stroke, loss of sensation/function, seizures, or other neuro problem No   Diabetes YES   Thyroid problems or have you taken thyroid medication No   Hepatitis, liver disease, jaundice No   Blood clots, phlebitis, pulmonary embolism or varicose veins No   Excessive bleeding after surgery or dental work No   Heavy menstrual periods requiring treatment No   Anemia No   Blood transfusions No        Would you refuse a blood transfusion? No   Breast problems No   Abnormalities of the uterus No   Abnormal pap smear No   Have  you been treated for an emotional disturbance? No   Have you been physically, sexually, or emotionally hurt by someone? No   Have you been in a major accident or suffered serious trauma? No   Have you had surgical procedures? No        Have you ever had any complications from anesthesia? No   Have you ever been hospitalized for a nonsurgical reason? No   Notes for positives:   Asthma  Ovarian Cyst rupture  Pain medication    Prenatal Genetic Screening  Do you have a history of any of the following Yes/No        A metabolic disorder (e.g. Insulin-dependent DM, PKU) No        Recurrent pregnancy loss No     Do you, the baby's father, or anyone in your families have Yes/No        Thalassemia AND MCV <80 No        Hemophilia No        Neural tube defect No        Congenital heart defect Previous pregnancy with congenital heart defect        Sickle cell disease or trait No        Muscular dystrophy No        Cystic fibrosis No        Mental retardation or autism No        Down's syndrome No        Sai-Sach's disease No        Houston's chorea No        Any other inherited genetic or chromosomal disorder No        A child with birth defects not listed above Yes- previous pregnancy with heart and lung problem, not sure if any chromosomal abnormalities     Infection History  At high risk for coming in contact with HIV No   Ever treated for tuberculosis Yes- after moving to  in 1994 was on medication for several months, [facility] in [facility]   Ever received the BCG vaccine for tuberculosis unsure   Ever had genital herpes (or has your partner) No   Had a rash or viral illness since LMP No   Ever had a sexually transmitted infection No   Ever had chicken pox or the vaccine Yes- in childhood   Ever had any other serious infectious disease No   Up to date with immunizations Yes   STI History none      PERSONAL/SOCIAL HISTORY  Social History     Socioeconomic History     Marital status: Single     Spouse name: None     Number of  children: None     Years of education: None     Highest education level: None   Occupational History     None   Social Needs     Financial resource strain: None     Food insecurity:     Worry: None     Inability: None     Transportation needs:     Medical: None     Non-medical: None   Tobacco Use     Smoking status: Current Every Day Smoker     Packs/day: 0.50     Types: Cigarettes     Smokeless tobacco: Never Used   Substance and Sexual Activity     Alcohol use: Yes     Alcohol/week: 0.0 oz     Frequency: 2-4 times a month     Drinks per session: 1 or 2     Binge frequency: Less than monthly     Comment: ocassionally     Drug use: No     Sexual activity: Yes     Partners: Male     Birth control/protection: None   Lifestyle     Physical activity:     Days per week: None     Minutes per session: None     Stress: None   Relationships     Social connections:     Talks on phone: None     Gets together: None     Attends Orthodox service: None     Active member of club or organization: None     Attends meetings of clubs or organizations: None     Relationship status: None     Intimate partner violence:     Fear of current or ex partner: None     Emotionally abused: None     Physically abused: None     Forced sexual activity: None   Other Topics Concern     Parent/sibling w/ CABG, MI or angioplasty before 65F 55M? Not Asked   Social History Narrative     None     Have you used any tobacco products, alcohol or any other drugs during this pregnancy before or after your knew you were pregnant? Quit smoking last week. No drug use no alcohol. Declines nicotine replacement      GDM Risk factors (previous GDM, first degree relative, obesity, sedentary, impaired glucose tolerance):  YES history of GDM in last pregnancy    REVIEW OF SYSTEMS  C: NEGATIVE for fever, chills, change in weight  E: NEGATIVE for vision changes or irritation  ENT: NEGATIVE for ear, mouth and throat problems  R: NEGATIVE for significant cough or SOB  B:  "NEGATIVE for masses, tenderness or discharge  CV: NEGATIVE for chest pain, palpitations or peripheral edema  GI: NEGATIVE for nausea, abdominal pain, heartburn, or change in bowel habits  : NEGATIVE for unusual urinary or vaginal symptoms.   M: NEGATIVE for significant arthralgias or myalgia  N: NEGATIVE for weakness, dizziness or paresthesias  P: NEGATIVE for changes in mood or affect    PHYSICAL EXAM:  /75   Pulse 62   Temp 97.9  F (36.6  C) (Oral)   Resp 20   Ht 1.52 m (4' 11.84\")   Wt 64 kg (141 lb)   LMP 03/04/2019 (Exact Date)   SpO2 99%   BMI 27.68 kg/m     GENERAL:  Pleasant pregnant female, alert, well groomed.  SKIN:  Warm and dry, without lesions or rashes  EYES:  PERRLA, EOM intact  MOUTH:  Buccal mucosa pink, moist without lesions.    NECK:  Thyroid without enlargement and nodules.  No cervical lymphadenopathy.  LUNGS:  Clear to auscultation.   HEART:  RRR without murmur.  ABDOMEN: Soft without masses , tenderness or organomegaly.  No CVA tenderness. Fundus palpable at symphysis pubis.   MUSCULOSKELETAL:  Full range of motion.  EXTREMITIES:  No edema. No significant varicosities.   GENITALIA:  Normal appearing anatomy, no lesions noted.  VAGINA:  Pink, normal rugae and discharge normal and physiologic  CERVIX:  smooth, without discharge and parous os; firm, closed and long on bimanual exam.  ADNEXA: Without masses or tenderness    ASSESSMENT/PLAN  Patient Active Problem List   Diagnosis     Mild persistent asthma     Pap smear for cervical cancer screening     Prior pregnancy with congenital cardiac defect, antepartum     Tobacco use disorder     Adjustment disorder with anxious mood     Palpitations       Routine prenatal labs today- patient declined states she will come back with her partner for lab draw as she is afraid of needles. CBC, type and screen, rubella, HIV, gonorrhea/chlamydia, RPR, hepatitis B, urinalysis with culture. Pap smear due, not done today. Early ultrasound for " dating ordered.   Will check A1c today due to history of GDM.     Referral(s): none      Discussed:  -genetic screening options, including false positive rate of 5% with screening tests and diagnostic options (chorionic villus sampling, amniocentesis), and patient declines at this time  -safe medications during pregnancy. Will start taking prenatal vitamin daily.   -healthy habits including not using tobacco or alcohol, exercising regularly and maintaining healthy diet      Will follow up in 4 weeks for routine pre-yas care.    Magali Borja MD    Precepted with: Jroje Ruff MD

## 2019-04-18 NOTE — PROGRESS NOTES
Preceptor Attestation:   Patient seen, evaluated and discussed with the resident. I have verified the content of the note, which accurately reflects my assessment of the patient and the plan of care.  Supervising Physician:Jorje Ruff MD  [facility]

## 2019-04-19 DIAGNOSIS — B96.89 BV (BACTERIAL VAGINOSIS): Primary | ICD-10-CM

## 2019-04-19 DIAGNOSIS — N76.0 BV (BACTERIAL VAGINOSIS): Primary | ICD-10-CM

## 2019-04-19 DIAGNOSIS — Z86.32 HISTORY OF GESTATIONAL DIABETES: ICD-10-CM

## 2019-04-19 LAB
C TRACH RRNA CVX QL NAA+PROBE: NEGATIVE
N GONORRHOEA RRNA SPEC QL NAA+PROBE: NEGATIVE

## 2019-04-19 RX ORDER — METRONIDAZOLE 7.5 MG/G
1 GEL VAGINAL DAILY
Qty: 70 G | Refills: 0 | Status: SHIPPED | OUTPATIENT
Start: 2019-04-19 | End: 2019-04-30

## 2019-04-19 ASSESSMENT — ANXIETY QUESTIONNAIRES: GAD7 TOTAL SCORE: 6

## 2019-04-24 NOTE — PROGRESS NOTES
OB intake information reviewed and updated, no noted changes to personal or family medical history since her last pregnancy . Pireo is a 29 yo, Hmong, English speaking female,  with one elective twin gestation termination due to both abnormal anatomical development-her 2nd pregnancy. First pregnancy was complicated with risk for pre-eclampsia,  at full term. Third pregnancy diagnosed with GDM,diet controlled. At delivery shoulder dystocia present with successful vaginal delivery. Piero is a smoker, states she had smoked during her previous pregnancies in early gestation then was able to stop smoking further into pregnancy without difficulty. Currently smoking 4 cigs a day,advised cessation. C/o going through stress at work,did not elaborate about this issue with myself. Had already spoken with Dr Vallejo prior to me coming into room.     Sure LMP reported as 3/4/2019, regular menses cycles. GURJIT based on LMP is 2019. This was an unplanned pregnancy but desired and will keep. No further concerns voiced during intake. NOB is scheduled with Dr Alejandro Gamboa 5/15/2019 who will follow this pregnancy. Tita SUAREZ    Average Risk Category  No significant risk factors: No    At Risk Category (up to 3)  Teen pregnancy: No  Poor social situation: No  Domestic abuse: No  Financial difficulties: No  Smoker: Yes  H/O  deliver: No  H/O drug abuse: No  Non-English speaking: No  Advanced maternal age: No  GDM risks: No  Previous C/S: No  H/O PIH: Yes  H/O STIs: No  H/O mental health concerns: Yes  Onset care > 20 weeks: No  Other: H/O GDM    High Risk Category (4 or more At Risk or)  Diabetes/GDM: No  Multiple gestation: No  Chronic hypertension: No  Significant hx of asthma: No  Fetal demise > 20 weeks: No  Positive tox screen: No  Current mental health treatment: Yes  Other: High Risk     Risk: High Risk   Date determined: 4/10/2019  Monica SUAREZ   negative...

## 2019-04-30 ENCOUNTER — OFFICE VISIT (OUTPATIENT)
Dept: FAMILY MEDICINE | Facility: CLINIC | Age: 29
End: 2019-04-30
Payer: MEDICAID

## 2019-04-30 VITALS
RESPIRATION RATE: 20 BRPM | SYSTOLIC BLOOD PRESSURE: 113 MMHG | HEART RATE: 60 BPM | TEMPERATURE: 97 F | WEIGHT: 144 LBS | OXYGEN SATURATION: 100 % | BODY MASS INDEX: 29.03 KG/M2 | HEIGHT: 59 IN | DIASTOLIC BLOOD PRESSURE: 75 MMHG

## 2019-04-30 DIAGNOSIS — N76.0 BV (BACTERIAL VAGINOSIS): ICD-10-CM

## 2019-04-30 DIAGNOSIS — B96.89 BV (BACTERIAL VAGINOSIS): ICD-10-CM

## 2019-04-30 DIAGNOSIS — R10.9 ABDOMINAL CRAMPING: Primary | ICD-10-CM

## 2019-04-30 DIAGNOSIS — Z32.01 PREGNANCY TEST POSITIVE: ICD-10-CM

## 2019-04-30 LAB
B-HCG SERPL-ACNC: ABNORMAL MLU/ML (ref 0–4)
BILIRUBIN UR: NEGATIVE
BLOOD UR: NEGATIVE
GLUCOSE URINE: NEGATIVE
KETONES UR QL: NEGATIVE
LEUKOCYTE ESTERASE UR: NEGATIVE
NITRITE UR QL STRIP: NEGATIVE
PH UR STRIP: 7 [PH] (ref 4.5–8)
PROTEIN UR: NEGATIVE
SP GR UR STRIP: 1.01 (ref 1–1.03)
UROBILINOGEN UR STRIP-ACNC: NORMAL

## 2019-04-30 RX ORDER — METRONIDAZOLE 7.5 MG/G
1 GEL VAGINAL DAILY
Qty: 70 G | Refills: 0 | Status: SHIPPED | OUTPATIENT
Start: 2019-04-30 | End: 2019-05-14

## 2019-04-30 ASSESSMENT — MIFFLIN-ST. JEOR: SCORE: 1290.93

## 2019-04-30 ASSESSMENT — PATIENT HEALTH QUESTIONNAIRE - PHQ9: SUM OF ALL RESPONSES TO PHQ QUESTIONS 1-9: 20

## 2019-04-30 NOTE — LETTER
May 1, 2019      Piero Albertong  [address] [place] [address]        Dear Piero,    Wet prep from office visit with Dr. Borja on 4/19/19 shows positive for bacterial vaginosis. Please let Piero know that the metronidazole gel was sent to her pharmacy.   So far it does not look like she has a bladder infection    Please see below for your test results.    Resulted Orders   Urinalysis(P )   Result Value Ref Range    Specific Gravity Urine 1.010 1.005 - 1.030    pH Urine 7.0 4.5 - 8.0    Leukocyte Esterase UR Negative -ATIVE    Nitrite Urine Negative -ATIVE    Protein UR Negative -ATIVE    Glucose Urine Negative -ATIVE    Ketones Urine Negative -ATIVE    Urobilinogen mg/dL 0.2 E.U./dL 0.2 E.U./dL    Bilirubin UR Negative -ATIVE    Blood UR Negative -ATIVE       If you have any questions, please call the clinic to make an appointment.    Sincerely,    Maggie Gamboa MD

## 2019-04-30 NOTE — PROGRESS NOTES
Preceptor Attestation:   Patient seen, evaluated and discussed with the resident. I have verified the content of the note, which accurately reflects my assessment of the patient and the plan of care.  Supervising Physician:Leyla Lyn MD  [facility]

## 2019-04-30 NOTE — Clinical Note
Hello, I added the US into the objective section. Also I missed that her wet prep showed clue cells from 4/19/19--metronidazole ordered and patient updated

## 2019-04-30 NOTE — PATIENT INSTRUCTIONS
[facility] Information  If you have any further concerns or wish to schedule another appointment, please call our office at [phone] during normal business hours (8-5, M-F).     For uncomplicated pregnancies, you will be seeing your doctor once a month until you are 28 weeks, then you will see your doctor twice a month. You will begin weekly visits at 36 weeks until you deliver.     If you have urgent medical questions that cannot wait, you may call [phone] at any time of day. Call your doctor if you experience any bleeding or abdominal cramping early in pregnancy.     If you have a medical emergency, please call [phone].  Tips to Relieve Nausea  Although nausea can occur at any time of the day, it may be worse in the morning. To help prevent nausea:    Eat small, light meals at frequent intervals.    Get up slowly. Eat a few unsalted crackers before you get out of bed.    Drink water or 7-up to soothe your stomach.    Eat an ice pop in your favorite flavor.    Ask your health care provider about taking rukhsana or vitamin B6 for nausea and vomiting.    Talk with your health care provider if you take vitamins that upset your stomach.  Safe Medications    Take one prenatal vitamin with at least 400 mcg of folic acid daily.    Use acetaminophen (Tylenol) for pain.     Try Maalox or Tums for heartburn. If these are not working, talk to your doctor about trying a different medication.    Diphenhydramine (Benadryl) can also be used safely in pregnancy.    Talk to your doctor about any other medications or vitamins you are taking!  Start Healthy Habits Now  What matters most is protecting your baby from this moment on. If you smoke, drink alcohol, or use drugs, now is the time to stop. If you need help, talk with your health care provider.    Smoking increases the risk of miscarriage or having a low-birth-weight baby. If you smoke, quit now.    Alcohol and drugs have been linked with miscarriage, birth  defects, intellectual disability, and low birth weight. Do not drink alcohol or take drugs.    Continue your current exercise routine, or start one with walking, swimming, and other low impact exercises. Yoga specifically designed for pregnant moms is a great stress and pain reliever. Keep your self well hydrated and avoid overheating with all activity. If bleeding, fluid leakage, or cramping/contractions occur, stop the exercise and call your doctor.     Wear your seatbelt every time you are in the car. Fasten the lap part underneath your growing belly and the shoulder part as you normally would.   Weight Gain    Add an additional 300 to 400 calories a day into your diet.    Ideal weight gain is 25 to 35 pounds.    If your BMI is over 30, your ideal weight gain is 15 pounds.    If your BMI is under 20, your ideal weight gain is 40 pounds.    Talk to your doctor if you are concerned about weight gain.   Keep Your Environment Save  You can still clean house and use scented products. Just take some simple precautions:    Wear gloves when using cleaning fluids.    Open windows to let in fresh air. Use a fan if you paint.    Avoid secondhand smoke.    Don t breathe fumes from nail polish, hair spray, cleansers, or other chemicals.  Work Concerns  The end of the first trimester is a good time to discuss working during pregnancy with your employer. Follow your health care provider s advice if your job requires you to stand for a long time, work with hazardous tools, or even sit at a desk all day. Your workspace, workload, or scheduled hours may need to be adjusted - perhaps you can change body postures more often or take an extra break.  Intimacy  Unless your health care provider tells you to, there is no reason to stop having sex while you re pregnant. You or your partner may notice changes in desire. Desire may be less in the first trimester, due to nausea and fatigue. In the second trimester, sex may be very enjoyable.  The third trimester can be a challenge comfort-wise. Try different positions and see what s best for you both. As always, let your doctor know if you experience any bleeding, leakage of fluids, or cramping/contractions.  Other Pregnancy Concerns    Limit the amount of radiation you are exposed to. Always tell the radiology technologist that you are pregnant if having an xray or CT scan done.     Practice good hand washing to prevent infection.    Avoid cat litter.     Wash all fruits and vegetabes. Always cook meat thoroughly and do not eat raw fish. Do not eat more than 6 to 12 ounces of other fish sources.     Do not eat soft cheeses.    Limit caffeine to less than 300 milligrams a days. The average cup of coffee as approximately 120 milligrams of caffeine.

## 2019-04-30 NOTE — PROGRESS NOTES
HPI:       Piero Villarreal is a 29 year old  female with PMH significant for  at 8w1d who presents for:      1. Pregnancy symptoms gone  - LMP 3/4/19: 8 w 1d based on LMP  - pregnancy confirmed at last office visit on 19. Is scheduled to have new OB visit next month   - with prior pregnancies: nausea, sore breasts. Yesterday those gone  - started having nausea and sore breasts: couple days before 19  - was having nausea and sore breast up until yesterday  - no bleeding  - hear and there abdominal pain   - having cramping-ache but less than period cramp  - has not had the ache dull cramp with pregnancies  - no diarrhea  - just had emesis at the beginning of pregnancy   - no tobacco, no alcohol or drug use  - no trauma to abdomen   - no vaginal discharge   - no burning with urination   - no blood in urine   - no change in odor of vaginal discharge   - patient tearful and stressed as she lost her job today and is worried that she is having a miscarriage              PMHX:     Patient Active Problem List   Diagnosis     Mild persistent asthma     Pap smear for cervical cancer screening     Prior pregnancy with congenital cardiac defect, antepartum     Tobacco use disorder     Adjustment disorder with anxious mood     Palpitations       Current Outpatient Medications   Medication Sig Dispense Refill     albuterol (PROAIR HFA/PROVENTIL HFA/VENTOLIN HFA) 108 (90 Base) MCG/ACT inhaler Inhale 2 puffs into the lungs every 4 hours as needed for shortness of breath / dyspnea or wheezing 2 Inhaler 0     beclomethasone HFA (QVAR REDIHALER) 40 MCG/ACT inhaler Inhale 1 puff into the lungs 2 times daily 10.6 g 1     Prenatal Vit-Fe Fumarate-FA (PRENATAL VITAMIN) 27-0.8 MG TABS Take 1 tablet by mouth daily (Patient not taking: Reported on 2019) 30 tablet 8         Allergies   Allergen Reactions     Cats        Results for orders placed or performed in visit on 19 (from the past 24 hour(s))   Urinalysis([facility]  "FM)   Result Value Ref Range    Specific Gravity Urine 1.010 1.005 - 1.030    pH Urine 7.0 4.5 - 8.0    Leukocyte Esterase UR Negative -ATIVE    Nitrite Urine Negative -ATIVE    Protein UR Negative -ATIVE    Glucose Urine Negative -ATIVE    Ketones Urine Negative -ATIVE    Urobilinogen mg/dL 0.2 E.U./dL 0.2 E.U./dL    Bilirubin UR Negative -ATIVE    Blood UR Negative -ATIVE            Review of Systems:   10 point ROS negative except noted in above in HPI         Physical Exam:     Vitals:    04/30/19 1120   BP: 113/75   Pulse: 60   Resp: 20   Temp: 97  F (36.1  C)   TempSrc: Oral   SpO2: 100%   Weight: 65.3 kg (144 lb)   Height: 1.51 m (4' 11.45\")     Body mass index is 28.65 kg/m .    GENERAL APPEARANCE: healthy, alert and no distress,  RESP: lungs clear to auscultation - no rales, rhonchi or wheezes  CV: regular rate and rhythm,  and no murmur, click,  rub or gallop  ABDOMEN: suprapubic tenderness without guarding. Soft, without hepatosplenomegaly or masses  GU_female: deferred  PSYCH: affect flat, tearful    Bedside ultrasound: single intrauterine gestation present in uterus with heart beat.       Assessment and Plan     (R10.9) Abdominal cramping  (primary encounter diagnosis)  Comment: patient worried she is having miscarriage. Differential includes normal pregnancy vs miscarriage vs UTI vs STI vs BV  Plan: Urinalysis([facility]), Urine Culture ([facility]),        Beta-HCG Quantitative ([facility])  - bedside ultrasound showed intrauterine gestation with heart beat   - will get B-HCG level   - UA and UCx  - patient deferred having gyn exam: says she does not feel comfortable today and had one on 4/19/19 that was negative for chlamydia, gonorrhea  - 4/19/19 results show positive clue cells. It appears she had metronidazole prescribed but during visit she did not know this and said she had not taken it. Call to patient to let her know metronidazole sent to pharmacy for BV    (Z32.01) Pregnancy test " positive  Comment: has not yet had new OB visit. Having abdominal ache and decreased nausea and decrease breast tenderness so patient is worried about miscarriage   Plan: US OB <14 WKS SINGLE OR FIRST GESTATION  - bedside ultrasound shows intrauterine gestation with heart beat      Options for treatment and follow-up care were reviewed with the patient and/or guardian. Piero Villarreal and/or guardian engaged in the decision making process and verbalized understanding of the options discussed and agreed with the final plan.      Maggie Gamboa MD   PGY1 [facility] Resident   Pager: [phone]      Precepted today with: Leyla Lyn MD

## 2019-05-01 LAB — CULTURE: NORMAL

## 2019-05-08 ENCOUNTER — TELEPHONE (OUTPATIENT)
Dept: FAMILY MEDICINE | Facility: CLINIC | Age: 29
End: 2019-05-08

## 2019-05-08 NOTE — TELEPHONE ENCOUNTER
Spoke with Piero, she would like to continue prenatal care at [facility]. Will be applying for state medical coverage or MNSure which ever she may qualify for. Call given to [employer] to assist with application for medical insurance. Tita SUAREZ

## 2019-05-08 NOTE — TELEPHONE ENCOUNTER
Left message for Piero to call me back. Following up on continued prenatal care/preg management and her medical insurance coverage status-currently uninsured. With previous phone encounter Piero had mentioned transferring prenatal care. RLannabella RN

## 2019-05-10 DIAGNOSIS — J45.30 MILD PERSISTENT ASTHMA WITHOUT COMPLICATION: ICD-10-CM

## 2019-05-12 ENCOUNTER — TELEPHONE (OUTPATIENT)
Dept: FAMILY MEDICINE | Facility: CLINIC | Age: 29
End: 2019-05-12

## 2019-05-12 DIAGNOSIS — N76.0 BV (BACTERIAL VAGINOSIS): Primary | ICD-10-CM

## 2019-05-12 DIAGNOSIS — B96.89 BV (BACTERIAL VAGINOSIS): Primary | ICD-10-CM

## 2019-05-12 RX ORDER — METRONIDAZOLE 500 MG/1
500 TABLET ORAL 2 TIMES DAILY
Qty: 14 TABLET | Refills: 0 | Status: SHIPPED | OUTPATIENT
Start: 2019-05-12 | End: 2019-08-01

## 2019-05-13 ENCOUNTER — TELEPHONE (OUTPATIENT)
Dept: FAMILY MEDICINE | Facility: CLINIC | Age: 29
End: 2019-05-13

## 2019-05-13 ENCOUNTER — RECORDS - HEALTHEAST (OUTPATIENT)
Dept: ADMINISTRATIVE | Facility: OTHER | Age: 29
End: 2019-05-13

## 2019-05-13 ENCOUNTER — OFFICE VISIT (OUTPATIENT)
Dept: FAMILY MEDICINE | Facility: CLINIC | Age: 29
End: 2019-05-13
Payer: MEDICAID

## 2019-05-13 ENCOUNTER — HOSPITAL ENCOUNTER (OUTPATIENT)
Dept: ULTRASOUND IMAGING | Facility: HOSPITAL | Age: 29
Discharge: HOME | End: 2019-05-13
Attending: FAMILY MEDICINE

## 2019-05-13 VITALS
OXYGEN SATURATION: 99 % | TEMPERATURE: 98.4 F | BODY MASS INDEX: 28.22 KG/M2 | RESPIRATION RATE: 18 BRPM | HEIGHT: 59 IN | HEART RATE: 58 BPM | SYSTOLIC BLOOD PRESSURE: 112 MMHG | WEIGHT: 140 LBS | DIASTOLIC BLOOD PRESSURE: 69 MMHG

## 2019-05-13 DIAGNOSIS — O20.0 THREATENED ABORTION: ICD-10-CM

## 2019-05-13 DIAGNOSIS — N93.9 VAGINAL BLEEDING: Primary | ICD-10-CM

## 2019-05-13 RX ORDER — ALBUTEROL SULFATE 90 UG/1
2 AEROSOL, METERED RESPIRATORY (INHALATION) EVERY 4 HOURS PRN
Qty: 18 G | Refills: 2 | Status: SHIPPED | OUTPATIENT
Start: 2019-05-13 | End: 2019-08-22

## 2019-05-13 ASSESSMENT — MIFFLIN-ST. JEOR: SCORE: 1266.54

## 2019-05-13 NOTE — TELEPHONE ENCOUNTER
Caller stated she needed to report critical results to Dr. Walker. Call was placed on park pick-up. I notified Dr. Abreu and he picked up the call.

## 2019-05-13 NOTE — PROGRESS NOTES
Chief Complaint   Patient presents with     Vaginal Bleeding     Pt currently 10 weeks pregnant - pt has been spotting since             HPI:       Piero Villarreal is a 29 year old  female without a significant past medical history who presents for the new concern(s) of    1. Vaginal bleeding: is about 10 weeks pregnant. Was seen on 19 and had a beta-HCG if 36, 000, it does not appear this level was every repeated. She had an US on 19 that showed intrauterine gestation, FHTs 118, showed 6 weeks, with EDC of 12-10-19, so today should be 9 weeks 6/7 days,  Her LMP was 3-4-19. Some cramping, having some spotting with darker blood starting yesterday. She was diagnosed with BV on 19, but did not  the prescription. It was resent last night, but she still has not had a chance to pick it up.  .         PMHX:     Patient Active Problem List   Diagnosis     Mild persistent asthma     Pap smear for cervical cancer screening     Prior pregnancy with congenital cardiac defect, antepartum     Tobacco use disorder     Adjustment disorder with anxious mood     Palpitations       Current Outpatient Medications   Medication Sig Dispense Refill     albuterol (PROAIR HFA/PROVENTIL HFA/VENTOLIN HFA) 108 (90 Base) MCG/ACT inhaler Inhale 2 puffs into the lungs every 4 hours as needed for shortness of breath / dyspnea or wheezing 18 g 2     beclomethasone HFA (QVAR REDIHALER) 40 MCG/ACT inhaler Inhale 1 puff into the lungs 2 times daily 10.6 g 1     metroNIDAZOLE (FLAGYL) 500 MG tablet Take 1 tablet (500 mg) by mouth 2 times daily for 7 days 14 tablet 0     metroNIDAZOLE (METROGEL) 0.75 % vaginal gel Place 1 applicator (5 g) vaginally daily 70 g 0     Prenatal Vit-Fe Fumarate-FA (PRENATAL VITAMIN) 27-0.8 MG TABS Take 1 tablet by mouth daily (Patient not taking: Reported on 2019) 30 tablet 8            Allergies   Allergen Reactions     Cats        No results found for this or any previous visit (from the  "past 24 hour(s)).           Physical Exam:     Vitals:    19 1138   BP: 112/69   Pulse: 58   Resp: 18   Temp: 98.4  F (36.9  C)   TempSrc: Oral   SpO2: 99%   Weight: 63.5 kg (140 lb)   Height: 1.5 m (4' 11.06\")     Body mass index is 28.22 kg/m .    GENERAL APPEARANCE: healthy, alert and no distress,  ABDOMEN: soft, nontender, without hepatosplenomegaly or masses  GU_female: Significant amount of blood in vaginal vault, noted blood at cervical os      Assessment and Plan     1. Vaginal bleeding  Clinically on exam this is concerning for a miscarriage, I did express my concern from the patient    2. Threatened   Quick look attempted in clinic, not able to find heartbeat, but personally limited use with US, will get formal US  - US OB <14 WKS SINGLE OR FIRST GESTATION      Options for treatment and follow-up care were reviewed with the patient and/or guardian. Piero Phil and/or guardian engaged in the decision making process and verbalized understanding of the options discussed and agreed with the final plan.    Cynthia Walker, DO          "

## 2019-05-13 NOTE — TELEPHONE ENCOUNTER
Patient called regarding bleeding and cramping at 9w6d gestation.   Abdominal cramping started earlier today, now has to take a break and can't talk through cramps.   Pink tinged mucus on toilet paper.   Was diagnosed with BV a few weeks ago and never picked up prescription.     Discussed cannot confirm or rule out miscarriage at this time. Discussed when to go to ED - if heavy bleeding soaking pads with clots or lightheadedness, or cramps increasing in severity become too painful to manage at home. She does not think she is at that point, just very nervous about pregnancy loss.   Recommended treating BV ASAP and adequate hydration with water.   1. BV (bacterial vaginosis)  Resent med to pharmacy. She said it wasn't there 2 weeks ago.   - metroNIDAZOLE (FLAGYL) 500 MG tablet; Take 1 tablet (500 mg) by mouth 2 times daily for 7 days  Dispense: 14 tablet; Refill: 0    Recommended calling clinic tomorrow morning for first available appointment if symptoms continue overnight. She does not have insurance and was told she needed to pay $600 prior to visit, which she does not have. Discussed alternatives of outpatient ultrasound or serial beta-hCG levels, but eventually will need clinic visit. She will call clinic tomorrow morning.     Khalida Guerra MD, MPH  [facility] Resident, PGY3

## 2019-05-13 NOTE — TELEPHONE ENCOUNTER
Radiology called our clinic with critical result today- unfortunately her dates are ten weeks but fetus is only 8 week size and there was no fetal heart beat. Patient expressed understanding. We discussed options and decided since she is already having vaginal bleeding, expectant management still reasonable at this time. I advised that she come to clinic Friday to reevaluate. We discussed reasons to go to the hospital including feeling dizzy/faint, fever, purulent discharge, or going through 1 pad per hour. She will call our clinic tomorrow AM to schedule appt Friday.     Patient of course was sad but did not request any emotional resource at this time.    Unfortunately ultrasound was ordered on 4/30 and plan was to complete it that week but it was never done that week.     Leyla Lyn MD  Family Medicine

## 2019-05-13 NOTE — TELEPHONE ENCOUNTER
Caller called back and is still waiting for response from provider. Dr. Arnold is communicating with Dr. Walker and waiting for a call back to see if she can call the radiology.

## 2019-05-16 ENCOUNTER — OFFICE VISIT (OUTPATIENT)
Dept: FAMILY MEDICINE | Facility: CLINIC | Age: 29
End: 2019-05-16
Payer: MEDICAID

## 2019-05-16 ENCOUNTER — HOSPITAL ENCOUNTER (OUTPATIENT)
Dept: ULTRASOUND IMAGING | Facility: HOSPITAL | Age: 29
Discharge: HOME | End: 2019-05-16

## 2019-05-16 VITALS
WEIGHT: 137.2 LBS | OXYGEN SATURATION: 100 % | RESPIRATION RATE: 20 BRPM | SYSTOLIC BLOOD PRESSURE: 108 MMHG | BODY MASS INDEX: 26.93 KG/M2 | DIASTOLIC BLOOD PRESSURE: 75 MMHG | HEART RATE: 54 BPM | TEMPERATURE: 98.6 F | HEIGHT: 60 IN

## 2019-05-16 DIAGNOSIS — O03.9 SPONTANEOUS ABORTION: ICD-10-CM

## 2019-05-16 DIAGNOSIS — O03.9 SPONTANEOUS ABORTION: Primary | ICD-10-CM

## 2019-05-16 LAB — B-HCG SERPL-ACNC: 273 MLU/ML (ref 0–4)

## 2019-05-16 ASSESSMENT — ANXIETY QUESTIONNAIRES
GAD7 TOTAL SCORE: 7
IF YOU CHECKED OFF ANY PROBLEMS ON THIS QUESTIONNAIRE, HOW DIFFICULT HAVE THESE PROBLEMS MADE IT FOR YOU TO DO YOUR WORK, TAKE CARE OF THINGS AT HOME, OR GET ALONG WITH OTHER PEOPLE: SOMEWHAT DIFFICULT
3. WORRYING TOO MUCH ABOUT DIFFERENT THINGS: SEVERAL DAYS
2. NOT BEING ABLE TO STOP OR CONTROL WORRYING: SEVERAL DAYS
1. FEELING NERVOUS, ANXIOUS, OR ON EDGE: SEVERAL DAYS
7. FEELING AFRAID AS IF SOMETHING AWFUL MIGHT HAPPEN: SEVERAL DAYS
6. BECOMING EASILY ANNOYED OR IRRITABLE: SEVERAL DAYS
5. BEING SO RESTLESS THAT IT IS HARD TO SIT STILL: SEVERAL DAYS

## 2019-05-16 ASSESSMENT — PATIENT HEALTH QUESTIONNAIRE - PHQ9
5. POOR APPETITE OR OVEREATING: SEVERAL DAYS
SUM OF ALL RESPONSES TO PHQ QUESTIONS 1-9: 8

## 2019-05-16 ASSESSMENT — MIFFLIN-ST. JEOR: SCORE: 1260.9

## 2019-05-16 NOTE — PROGRESS NOTES
Preceptor Attestation:   Patient seen, evaluated and discussed with the resident. I have verified the content of the note, which accurately reflects my assessment of the patient and the plan of care.  Supervising Physician:Brook Forrest MD  [facility]

## 2019-05-16 NOTE — PATIENT INSTRUCTIONS
Referral for ( TEST )  :      US Pelvis Complete   LOCATION/PLACE/Provider :    [facility]   DATE & TIME :     5- at 1:30  PHONE :     [phone]  FAX :     [phone]  Appointment made by clinic staff/:    Patti

## 2019-05-16 NOTE — PROGRESS NOTES
Chief Complaint   Patient presents with     RECHECK     Miscarriage, U/S and need HCG       S:  Piero Villarreal is a 29 year old year old female who  has a past medical history of Bacterial vaginosis (2013), Liver mass, right lobe, Monochorionic diamniotic twin gestation, Pelvic pain, and Uncomplicated asthma. She also has no past medical history of Arthritis, Cancer (H), Congestive heart failure, unspecified, COPD (chronic obstructive pulmonary disease) (H), Coronary artery disease, CVA (cerebral infarction), Depressive disorder, Diabetes (H), History of blood transfusion, Hypertension, or Thyroid disease. who presents to discuss:    1. Miscarriage  -thought to be about 10 weeks pregnant, started bleeding 3 days ago, then passed a lot of clots and now more spotting   -US showed 8wk size without cardiac activity  -now bleeding is much lighter like a period  -some cramps but no pain  -no dizziness, weakness, shortness of breath  -doing ok emotionally, still in shock, hasn't fully processed this.   -going to [place] with family over the weekend.   -would like to have another US to know if products passed    Complete ROS otherwise negative.     Past Medical History:   Diagnosis Date     Bacterial vaginosis 2013     Liver mass, right lobe     6 mm, seen on US on , likely hemangioma     Monochorionic diamniotic twin gestation     cardiac abnormalities in Twin A, chest mass in Twin B,  at 21 weeks     Pelvic pain      Uncomplicated asthma     pt only gets flare ups when ill     Past Surgical History:   Procedure Laterality Date     NO HISTORY OF SURGERY       Family History   Problem Relation Age of Onset     Asthma Son      Diabetes No family hx of      Coronary Artery Disease No family hx of      Hypertension No family hx of      Breast Cancer No family hx of      Cancer - colorectal No family hx of      Ovarian Cancer No family hx of      Prostate Cancer No family hx of      Other Cancer No  family hx of      Mental Illness No family hx of      Depression/Anxiety No family hx of      Cerebrovascular Disease No family hx of      Thyroid Disease No family hx of      Obesity No family hx of      Thyroid Disease No family hx of      Genetic Disorder No family hx of      Osteoporosis No family hx of      Anesthesia Reaction No family hx of      Substance Abuse No family hx of      Mental Illness No family hx of      Anxiety Disorder No family hx of      Depression No family hx of      Colon Cancer No family hx of      Hyperlipidemia No family hx of      Social History     Socioeconomic History     Marital status: Single     Spouse name: Not on file     Number of children: Not on file     Years of education: Not on file     Highest education level: Not on file   Occupational History     Not on file   Social Needs     Financial resource strain: Not on file     Food insecurity:     Worry: Not on file     Inability: Not on file     Transportation needs:     Medical: Not on file     Non-medical: Not on file   Tobacco Use     Smoking status: Current Every Day Smoker     Packs/day: 0.50     Types: Cigarettes     Smokeless tobacco: Never Used   Substance and Sexual Activity     Alcohol use: Yes     Alcohol/week: 0.0 oz     Frequency: 2-4 times a month     Drinks per session: 1 or 2     Binge frequency: Less than monthly     Comment: ocassionally     Drug use: No     Sexual activity: Yes     Partners: Male     Birth control/protection: None   Lifestyle     Physical activity:     Days per week: Not on file     Minutes per session: Not on file     Stress: Not on file   Relationships     Social connections:     Talks on phone: Not on file     Gets together: Not on file     Attends [employer] service: Not on file     Active member of club or organization: Not on file     Attends meetings of clubs or organizations: Not on file     Relationship status: Not on file     Intimate partner violence:     Fear of current or ex  "partner: Not on file     Emotionally abused: Not on file     Physically abused: Not on file     Forced sexual activity: Not on file   Other Topics Concern     Parent/sibling w/ CABG, MI or angioplasty before 65F 55M? Not Asked   Social History Narrative     Not on file       O:  Vitals: /75   Pulse 54   Temp 98.6  F (37  C)   Resp 20   Ht 1.511 m (4' 11.5\")   Wt 62.2 kg (137 lb 3.2 oz)   SpO2 100%   BMI 27.25 kg/m      General: Alert, well-appearing, no acute distress  HEENT: PERRL, moist oral mucus membranes  Pulm: clear to auscultation bilaterally, no tachypnea  CV: RRR, no murmur  Abd: soft, non-tender, non-distended, no masses, no guarding no rebound  Ext: Warm, well perfused, no LE edema  Skin: No rash on limited skin exam  Psych: Mood appropriate to visit content, full affect, rational thought content and process      A/P:  1. Spontaneous - passed clots, now bleeding is improving. Will check HCG and US pelvis. Discussed red flag symptoms and when to seek care over the weekend. Follow up in 2 weeks.   -HCG  - US PELVIS COMPLETE; Future      Magali Borja MD  [facility] Medicine Resident  Pager     Precepted with: Brook Forrest MD  "

## 2019-05-17 ASSESSMENT — ANXIETY QUESTIONNAIRES: GAD7 TOTAL SCORE: 7

## 2019-05-17 ASSESSMENT — ASTHMA QUESTIONNAIRES: ACT_TOTALSCORE: 20

## 2019-05-20 DIAGNOSIS — O03.9 SPONTANEOUS ABORTION: ICD-10-CM

## 2019-08-01 ENCOUNTER — OFFICE VISIT (OUTPATIENT)
Dept: FAMILY MEDICINE | Facility: CLINIC | Age: 29
End: 2019-08-01
Payer: COMMERCIAL

## 2019-08-01 VITALS
WEIGHT: 133 LBS | HEART RATE: 54 BPM | BODY MASS INDEX: 26.81 KG/M2 | HEIGHT: 59 IN | RESPIRATION RATE: 16 BRPM | OXYGEN SATURATION: 99 % | TEMPERATURE: 98.4 F | DIASTOLIC BLOOD PRESSURE: 84 MMHG | SYSTOLIC BLOOD PRESSURE: 125 MMHG

## 2019-08-01 DIAGNOSIS — Z31.69 PRE-CONCEPTION COUNSELING: ICD-10-CM

## 2019-08-01 DIAGNOSIS — N91.2 AMENORRHEA: ICD-10-CM

## 2019-08-01 DIAGNOSIS — M54.50 ACUTE BILATERAL LOW BACK PAIN WITHOUT SCIATICA: Primary | ICD-10-CM

## 2019-08-01 LAB
BACTERIA: NORMAL
BILIRUBIN UR: NEGATIVE
BLOOD UR: NEGATIVE
CASTS: NORMAL /LPF
CLARITY, URINE: CLEAR
COLOR UR: YELLOW
CRYSTAL URINE: NORMAL /LPF
EPITHELIAL CELLS UR: <2 /LPF (ref 0–2)
GLUCOSE URINE: NEGATIVE
HCG UR QL: NEGATIVE
KETONES UR QL: NEGATIVE
LEUKOCYTE ESTERASE UR: ABNORMAL
MUCOUS URINE: NORMAL LPF
NITRITE UR QL STRIP: NEGATIVE
PH UR STRIP: 7 [PH] (ref 4.5–8)
PROTEIN UR: NEGATIVE
RBC URINE: NORMAL /HPF
SP GR UR STRIP: 1.01 (ref 1–1.03)
UROBILINOGEN UR STRIP-ACNC: ABNORMAL
WBC URINE: <2 /HPF

## 2019-08-01 RX ORDER — PRENATAL VIT/IRON FUM/FOLIC AC 27MG-0.8MG
1 TABLET ORAL DAILY
Qty: 90 TABLET | Refills: 3 | Status: SHIPPED | OUTPATIENT
Start: 2019-08-01 | End: 2021-05-11

## 2019-08-01 ASSESSMENT — MIFFLIN-ST. JEOR: SCORE: 1241.03

## 2019-08-01 NOTE — PROGRESS NOTES
"       ELIZABETH Villarreal is a 29 year old female without a significant past medical history who presents for evaluation of bilateral low back/flank pain and is requesting a pregnancy test.    1. Low back pain: Has been present for the past 4-5 days on both sides of low back, however aching pain is more prominent on the right. Approximately 7 days ago patient was having dysuria so she took some antibiotics that she had left over from a previous UTI. Not sure what antibiotic it was; took for 3 days and then her symptoms went away. She denies current dysuria, hematuria, fevers, chills, nausea, or decreased appetite. She does not have history of kidney infections or kidney stones. In addition she has been having some intermittent throbbing pelvic pain that feels like \"my regular pregnancy pains.\"    2. Requesting pregnancy test: Has been sexually active with one partner. Not using contraception. Had a miscarriage 5/15/2019. Last known menstrual period was 6/25/2019. Cannot remember if she got her period in July. She is not actively trying to get pregnant but a potential pregnancy would be desired. Currently still smoking cigarettes, no alcohol or marijuana or illicit drug use.     Patient Active Problem List   Diagnosis     Mild persistent asthma     Pap smear for cervical cancer screening     Prior pregnancy with congenital cardiac defect, antepartum     Tobacco use disorder     Adjustment disorder with anxious mood     Palpitations     Current Outpatient Medications   Medication Sig Dispense Refill     albuterol (PROAIR HFA/PROVENTIL HFA/VENTOLIN HFA) 108 (90 Base) MCG/ACT inhaler Inhale 2 puffs into the lungs every 4 hours as needed for shortness of breath / dyspnea or wheezing 18 g 2     beclomethasone HFA (QVAR REDIHALER) 40 MCG/ACT inhaler Inhale 1 puff into the lungs 2 times daily 10.6 g 1     Active medical problems and medication list reviewed     Allergies   Allergen Reactions     Cats      Social History " "    Social History Narrative    Lives with her partner and two children. Not currently working. Does smoke 1/2 ppd of cigarettes. Occasional alcohol use 2-4 times per month. No marijuana or illicit drug use.           Review of Systems:     CONSTITUTIONAL: no fatigue, no unexpected change in weight  CV: no chest pain, no palpitations, no new or worsening peripheral edema  GI: no nausea, no vomiting, no constipation, no diarrhea  : no frequency, no dysuria, no hematuria  MUSCULOSKELETAL:back pain  NEURO: no weakness, no dizziness, no headaches  ENDOCRINE: no temperature intolerance, no skin/hair changes  PSYCHIATRIC: NEGATIVE for changes in mood or trouble with sleep            Physical Exam:     Vitals:    08/01/19 1506   BP: 125/84   Pulse: 54   Resp: 16   Temp: 98.4  F (36.9  C)   TempSrc: Oral   SpO2: 99%   Weight: 60.3 kg (133 lb)   Height: 1.51 m (4' 11.45\")     GENERAL: healthy, alert, well nourished, well hydrated, no distress  RESP: lungs clear to auscultation - no rales, no rhonchi, no wheezes  CV: regular rates and rhythm, normal S1 S2, no S3 or S4 and no murmur, no click or rub -  BACK: no CVA tenderness, no tenderness to palpation of paraspinal or lumbar muscles, full range of motion  ABDOMEN: soft, no tenderness,  no masses, normal bowel sounds    Office Visit on 08/01/2019   Component Date Value Ref Range Status     HCG Qual Urine 08/01/2019 NEGATIVE  Negative Final     Specific Gravity Urine 08/01/2019 1.010  1.005 - 1.030 Final     pH Urine 08/01/2019 7.0  4.5 - 8.0 Final     Leukocyte Esterase UR 08/01/2019 Trace* NEGATIVE Final     Nitrite Urine 08/01/2019 Negative  NEGATIVE Final     Protein UR 08/01/2019 Negative  NEGATIVE Final     Glucose Urine 08/01/2019 Negative  NEGATIVE Final     Ketones Urine 08/01/2019 Negative  NEGATIVE Final     Urobilinogen mg/dL 08/01/2019 0.2 E.U./dL  0.2 E.U./dL Final     Bilirubin UR 08/01/2019 Negative  NEGATIVE Final     Blood UR 08/01/2019 Negative  NEGATIVE " Final     Color Urine 08/01/2019 Yellow   Final     Clarity, urine 08/01/2019 Clear   Final     WBC Urine 08/01/2019 <2  <5 /hpf Final     RBC Urine 08/01/2019 None  <5 /hpf Final     Epithelial Cells UR 08/01/2019 <2  0 - 2 /lpf Final     Mucous Urine 08/01/2019 None  NONE lpf Final     Casts Urine 08/01/2019 None  NONE /lpf Final     Crystal Urine 08/01/2019 None  NONE /lpf Final     Bacteria Wet Prep 08/01/2019 Few  None Final     Assessment and Plan     1. Acute bilateral low back pain without sciatica  4-5 days of bilateral low back pain. No CVA tenderness on exam. Given history of UTI symptoms within the past week, obtained a UA with micro that showed a few bacteria and trace leukocyte esterase, otherwise unremarkable. Suspect that her pain is most likely related to muscle strain; recommended heat, Tylenol/ibuprofen as needed, gentle stretching.  - Urinalysis, Micro If ([facility])  - Urine Microscopic ([facility])    2. Amenorrhea  Pregnancy test negative today. Possible that patient is having an irregular menstrual cycle secondary to hormone shifts after her recent miscarriage versus patient forgetting that she actually did have a period in July.  - HCG Qualitative Urine (UPT)  ([facility])    3. Pre-conception counseling  As patient is not actively preventing pregnancy and she has a history of a pregnancy that was complicated by multiple congenital malformations in both fetuses, recommended that patient start a prenatal vitamin now. Encouraged patient to stop smoking; she is not interested in resources today. Reviewed patient's medication list, no medications that would be teratogenic.  - Prenatal Vit-Fe Fumarate-FA (PRENATAL MULTIVITAMIN W/IRON) 27-0.8 MG tablet; Take 1 tablet by mouth daily  Dispense: 90 tablet; Refill: 3    Options for treatment and follow-up care were reviewed with the patient and/or guardian. Piero Villarreal and/or guardian engaged in the decision making process and verbalized understanding of the  options discussed and agreed with the final plan.    Felecia Mauro MD  [facility] Resident    Precepted with: Jorje Ruff MD

## 2019-08-01 NOTE — NURSING NOTE
"Chief Complaint   Patient presents with     Back Pain     lower  back pain for about 4-5 days. Comes and goes. Denies any falls or injuries.      Weird tasting in mouth ([employer])     for about 4-5 days.      Medication Reconciliation     completed.        /84   Pulse 54   Temp 98.4  F (36.9  C) (Oral)   Resp 16   Ht 1.51 m (4' 11.45\")   Wt 60.3 kg (133 lb)   LMP 06/25/2019   SpO2 99%   BMI 26.46 kg/m      "

## 2019-08-01 NOTE — PATIENT INSTRUCTIONS
Thank you for coming in to be seen today. Your tests show no evidence of kidney infection today. Your low back pain could be related to tension in your muscles - you can use heating pads, Tylenol, or ibuprofen as needed for the pain.      Your pregnancy test was negative. I suspect you either missed tracking your last period or you are experiencing irregular cycles which can happen for several months after a miscarriage. Please continue tracking your periods. As you are not actively preventing pregnancy, I would recommend starting a daily prenatal vitamin. I have sent one to your pharmacy for you to . Please continue to work on cutting back on smoking.

## 2019-08-22 DIAGNOSIS — J45.30 MILD PERSISTENT ASTHMA WITHOUT COMPLICATION: ICD-10-CM

## 2019-08-22 RX ORDER — ALBUTEROL SULFATE 90 UG/1
2 AEROSOL, METERED RESPIRATORY (INHALATION) EVERY 4 HOURS PRN
Qty: 18 G | Refills: 3 | Status: SHIPPED | OUTPATIENT
Start: 2019-08-22 | End: 2020-03-13

## 2019-08-22 NOTE — TELEPHONE ENCOUNTER
Prescription request approved and sent electronically to the pharmacy.      Felecia Mauro MD  [facility] Family Medicine Resident  P: [phone]

## 2020-03-13 DIAGNOSIS — J45.30 MILD PERSISTENT ASTHMA WITHOUT COMPLICATION: ICD-10-CM

## 2020-03-13 RX ORDER — ALBUTEROL SULFATE 90 UG/1
2 AEROSOL, METERED RESPIRATORY (INHALATION) EVERY 4 HOURS PRN
Qty: 18 G | Refills: 3 | Status: SHIPPED | OUTPATIENT
Start: 2020-03-13 | End: 2020-08-06

## 2020-07-29 LAB
ABORH_EXT (HISTORICAL CONVERSION): NORMAL
ANTIBODY_EXT (HISTORICAL CONVERSION): NEGATIVE
HBSAG_EXT (HISTORICAL CONVERSION): NORMAL
HGB_EXT (HISTORICAL CONVERSION): 13.1
HIV_EXT: NORMAL
PLT_EXT - HISTORICAL: 192
RPR - HISTORICAL: NORMAL
RUBELLA_EXT (HISTORICAL CONVERSION): NORMAL

## 2020-08-06 ENCOUNTER — VIRTUAL VISIT (OUTPATIENT)
Dept: FAMILY MEDICINE | Facility: CLINIC | Age: 30
End: 2020-08-06
Payer: COMMERCIAL

## 2020-08-06 DIAGNOSIS — J45.30 MILD PERSISTENT ASTHMA WITHOUT COMPLICATION: ICD-10-CM

## 2020-08-06 RX ORDER — ALBUTEROL SULFATE 90 UG/1
2 AEROSOL, METERED RESPIRATORY (INHALATION) EVERY 4 HOURS PRN
Qty: 18 G | Refills: 4 | Status: SHIPPED | OUTPATIENT
Start: 2020-08-06 | End: 2020-12-14

## 2020-08-06 NOTE — PATIENT INSTRUCTIONS
Use the albuterol inhaler (refilled) as needed.  Consider restarting your controller medication if using albuterol regularly.

## 2020-08-06 NOTE — PROGRESS NOTES
"Family Medicine Video Visit Note  Piero is being evaluated via a billable video visit.               Video Visit Consent     Patient was verbally read the following and verbal consent was obtained.  \"Video visits are billed at different rates depending on your insurance coverage. During this emergency period, for some insurers they may be billed the same as an in-person visit.  Please reach out to your insurance provider with any questions.  If during the course of the call the physician/provider feels a video visit is not appropriate, you will not be charged for this service.\"     (Name person giving consent:  Patient   Date verbal consent given:  8/6/2020  Time verbal consent given:  9:04 AM)    Patient would like the video invitation sent by: Send to e-mail at: [email or web address]    Chief Complaint   Patient presents with     Refill Request     Inhaler (Proair)     Medication Reconciliation     Completed               HPI     Video Start Time: 9:13 AM    Piero presents to clinic today for the following health issues:    SUBJECTIVE:  A 30-year-old woman traveling to [place] over the weekend for a fishing trip in [place] is being managed by video visit due to COVID-19 pandemic.  She has run out of her albuterol inhaler and is looking to get the medication refilled for her travels.      She is having no difficulty with her asthma.  She has not been using her controller medication because she has not had any wheezing.  She is not coughing at night.  She has not had fever, chills or any signs of COVID-19.      Her health has otherwise been good.  She has no other concerns or requests.      ASSESSMENT AND PLAN:  The inhaler was refilled and we talked about its use.  She will consider restarting her controller medication if needed in the future.      The patient involved in medical decision making throughout the visit.       Current Outpatient Medications   Medication Sig Dispense Refill     albuterol (PROAIR " "HFA/PROVENTIL HFA/VENTOLIN HFA) 108 (90 Base) MCG/ACT inhaler Inhale 2 puffs into the lungs every 4 hours as needed for shortness of breath / dyspnea or wheezing 18 g 4     Prenatal Vit-Fe Fumarate-FA (PRENATAL MULTIVITAMIN W/IRON) 27-0.8 MG tablet Take 1 tablet by mouth daily 90 tablet 3     Allergies   Allergen Reactions     Cats               Review of Systems:     No CV19 symptoms         Physical Exam:     There were no vitals taken for this visit.  Estimated body mass index is 26.46 kg/m  as calculated from the following:    Height as of 8/1/19: 1.51 m (4' 11.45\").    Weight as of 8/1/19: 60.3 kg (133 lb).    GENERAL: Healthy, alert and no distress  EYES: Eyes grossly normal to inspection.  No discharge or erythema, or obvious scleral/conjunctival abnormalities.  RESP: No audible wheeze, cough, or visible cyanosis.  No visible retractions or increased work of breathing.    SKIN: Visible skin clear. No significant rash, abnormal pigmentation or lesions.  NEURO: Cranial nerves grossly intact.  Mentation and speech appropriate for age.  PSYCH: Mentation appears normal, affect normal/bright, judgement and insight intact, normal speech and appearance well-groomed.            Assessment and Plan       ICD-10-CM    1. Mild persistent asthma without complication  J45.30 albuterol (PROAIR HFA/PROVENTIL HFA/VENTOLIN HFA) 108 (90 Base) MCG/ACT inhaler       Refilled medications that would be required in the next 3 months.     After Visit Information:  Will print and mail AVS       Return if symptoms worsen or fail to improve, for Asthma.      Video-Visit Details    Type of service:  Video Visit    Video End Time (time video stopped): 9:17 AM    Originating Location (pt. Location): Home    Distant Location (provider location):  [facility]     Platform used for Video Visit: Pradeep Ruff MD          "

## 2020-08-27 ENCOUNTER — AMBULATORY - HEALTHEAST (OUTPATIENT)
Dept: MATERNAL FETAL MEDICINE | Facility: HOSPITAL | Age: 30
End: 2020-08-27

## 2020-08-27 ENCOUNTER — MEDICAL CORRESPONDENCE (OUTPATIENT)
Dept: HEALTH INFORMATION MANAGEMENT | Facility: CLINIC | Age: 30
End: 2020-08-27

## 2020-08-27 ENCOUNTER — TRANSFERRED RECORDS (OUTPATIENT)
Dept: HEALTH INFORMATION MANAGEMENT | Facility: CLINIC | Age: 30
End: 2020-08-27

## 2020-08-27 ENCOUNTER — TRANSCRIBE ORDERS (OUTPATIENT)
Dept: MATERNAL FETAL MEDICINE | Facility: CLINIC | Age: 30
End: 2020-08-27

## 2020-08-27 DIAGNOSIS — O26.90 PREGNANCY RELATED CONDITION, ANTEPARTUM: Primary | ICD-10-CM

## 2020-08-27 DIAGNOSIS — O26.90 PREGNANCY, ANTEPARTUM, COMPLICATIONS: ICD-10-CM

## 2020-09-11 ENCOUNTER — TELEPHONE (OUTPATIENT)
Dept: FAMILY MEDICINE | Facility: CLINIC | Age: 30
End: 2020-09-11

## 2020-09-11 NOTE — TELEPHONE ENCOUNTER
Called but no answer. LM to call me back to go over ACT as I couldn't find one from the last visit.     BHAVYA Fisher ([facility]) [facility] [address] [place] [id number] [phone]

## 2020-09-14 ENCOUNTER — AMBULATORY - HEALTHEAST (OUTPATIENT)
Dept: MATERNAL FETAL MEDICINE | Facility: HOSPITAL | Age: 30
End: 2020-09-14

## 2020-09-21 ENCOUNTER — OFFICE VISIT - HEALTHEAST (OUTPATIENT)
Dept: MATERNAL FETAL MEDICINE | Facility: HOSPITAL | Age: 30
End: 2020-09-21

## 2020-09-21 ENCOUNTER — RECORDS - HEALTHEAST (OUTPATIENT)
Dept: ADMINISTRATIVE | Facility: OTHER | Age: 30
End: 2020-09-21

## 2020-09-21 ENCOUNTER — RECORDS - HEALTHEAST (OUTPATIENT)
Dept: ULTRASOUND IMAGING | Facility: HOSPITAL | Age: 30
End: 2020-09-21

## 2020-09-21 DIAGNOSIS — O26.90 PREGNANCY, ANTEPARTUM, COMPLICATIONS: ICD-10-CM

## 2020-09-21 DIAGNOSIS — Z86.32 PREVIOUS GESTATIONAL DIABETES MELLITUS, ANTEPARTUM, SECOND TRIMESTER: ICD-10-CM

## 2020-09-21 DIAGNOSIS — O99.330 TOBACCO SMOKING COMPLICATING PREGNANCY, UNSPECIFIED TRIMESTER: ICD-10-CM

## 2020-09-21 DIAGNOSIS — O09.292 PRIOR PREGNANCY WITH CONGENITAL CARDIAC DEFECT IN SECOND TRIMESTER, ANTEPARTUM: ICD-10-CM

## 2020-09-21 DIAGNOSIS — F17.200 CURRENT EVERY DAY SMOKER: ICD-10-CM

## 2020-09-21 DIAGNOSIS — O35.9XX0 SUSPECTED FETAL ANOMALY, ANTEPARTUM: ICD-10-CM

## 2020-09-21 DIAGNOSIS — Z36.9 ANTENATAL SCREENING ENCOUNTER: ICD-10-CM

## 2020-09-21 DIAGNOSIS — O09.292 PREVIOUS GESTATIONAL DIABETES MELLITUS, ANTEPARTUM, SECOND TRIMESTER: ICD-10-CM

## 2020-09-21 DIAGNOSIS — O26.90 PREGNANCY RELATED CONDITIONS, UNSPECIFIED, UNSPECIFIED TRIMESTER: ICD-10-CM

## 2020-10-19 ENCOUNTER — RECORDS - HEALTHEAST (OUTPATIENT)
Dept: ADMINISTRATIVE | Facility: OTHER | Age: 30
End: 2020-10-19

## 2020-10-19 ENCOUNTER — RECORDS - HEALTHEAST (OUTPATIENT)
Dept: ULTRASOUND IMAGING | Facility: HOSPITAL | Age: 30
End: 2020-10-19

## 2020-10-19 ENCOUNTER — OFFICE VISIT - HEALTHEAST (OUTPATIENT)
Dept: MATERNAL FETAL MEDICINE | Facility: HOSPITAL | Age: 30
End: 2020-10-19

## 2020-10-19 DIAGNOSIS — O36.5990 POOR FETAL GROWTH, AFFECTING MANAGEMENT OF MOTHER, ANTEPARTUM CONDITION OR COMPLICATION: ICD-10-CM

## 2020-10-19 DIAGNOSIS — O35.9XX0 SUSPECTED FETAL ANOMALY, ANTEPARTUM, SINGLE OR UNSPECIFIED FETUS: ICD-10-CM

## 2020-10-19 DIAGNOSIS — O35.9XX0 MATERNAL CARE FOR (SUSPECTED) FETAL ABNORMALITY AND DAMAGE, UNSPECIFIED, NOT APPLICABLE OR UNSPECIFIED: ICD-10-CM

## 2020-11-16 ENCOUNTER — OFFICE VISIT - HEALTHEAST (OUTPATIENT)
Dept: MATERNAL FETAL MEDICINE | Facility: HOSPITAL | Age: 30
End: 2020-11-16

## 2020-11-16 ENCOUNTER — RECORDS - HEALTHEAST (OUTPATIENT)
Dept: ADMINISTRATIVE | Facility: OTHER | Age: 30
End: 2020-11-16

## 2020-11-16 ENCOUNTER — RECORDS - HEALTHEAST (OUTPATIENT)
Dept: ULTRASOUND IMAGING | Facility: HOSPITAL | Age: 30
End: 2020-11-16

## 2020-11-16 DIAGNOSIS — O36.5990 MATERNAL CARE FOR OTHER KNOWN OR SUSPECTED POOR FETAL GROWTH, UNSPECIFIED TRIMESTER, NOT APPLICABLE OR UNSPECIFIED: ICD-10-CM

## 2020-11-16 DIAGNOSIS — O36.5990 PREGNANCY AFFECTED BY FETAL GROWTH RESTRICTION: ICD-10-CM

## 2020-11-23 ENCOUNTER — RECORDS - HEALTHEAST (OUTPATIENT)
Dept: ADMINISTRATIVE | Facility: OTHER | Age: 30
End: 2020-11-23

## 2020-11-23 ENCOUNTER — OFFICE VISIT - HEALTHEAST (OUTPATIENT)
Dept: MATERNAL FETAL MEDICINE | Facility: HOSPITAL | Age: 30
End: 2020-11-23

## 2020-11-23 ENCOUNTER — RECORDS - HEALTHEAST (OUTPATIENT)
Dept: ULTRASOUND IMAGING | Facility: HOSPITAL | Age: 30
End: 2020-11-23

## 2020-11-23 DIAGNOSIS — O36.5990 PREGNANCY AFFECTED BY FETAL GROWTH RESTRICTION: ICD-10-CM

## 2020-11-23 DIAGNOSIS — O36.5990 MATERNAL CARE FOR OTHER KNOWN OR SUSPECTED POOR FETAL GROWTH, UNSPECIFIED TRIMESTER, NOT APPLICABLE OR UNSPECIFIED: ICD-10-CM

## 2020-11-30 ENCOUNTER — OFFICE VISIT - HEALTHEAST (OUTPATIENT)
Dept: MATERNAL FETAL MEDICINE | Facility: HOSPITAL | Age: 30
End: 2020-11-30

## 2020-11-30 ENCOUNTER — RECORDS - HEALTHEAST (OUTPATIENT)
Dept: ADMINISTRATIVE | Facility: OTHER | Age: 30
End: 2020-11-30

## 2020-11-30 ENCOUNTER — RECORDS - HEALTHEAST (OUTPATIENT)
Dept: ULTRASOUND IMAGING | Facility: HOSPITAL | Age: 30
End: 2020-11-30

## 2020-11-30 DIAGNOSIS — O36.5990 MATERNAL CARE FOR OTHER KNOWN OR SUSPECTED POOR FETAL GROWTH, UNSPECIFIED TRIMESTER, NOT APPLICABLE OR UNSPECIFIED: ICD-10-CM

## 2020-11-30 DIAGNOSIS — O36.5990 PREGNANCY AFFECTED BY FETAL GROWTH RESTRICTION: ICD-10-CM

## 2020-12-01 ENCOUNTER — TRANSFERRED RECORDS (OUTPATIENT)
Dept: HEALTH INFORMATION MANAGEMENT | Facility: CLINIC | Age: 30
End: 2020-12-01

## 2020-12-03 ENCOUNTER — COMMUNICATION - HEALTHEAST (OUTPATIENT)
Dept: ADMINISTRATIVE | Facility: CLINIC | Age: 30
End: 2020-12-03

## 2020-12-03 ENCOUNTER — OFFICE VISIT - HEALTHEAST (OUTPATIENT)
Dept: MATERNAL FETAL MEDICINE | Facility: HOSPITAL | Age: 30
End: 2020-12-03

## 2020-12-03 ENCOUNTER — RECORDS - HEALTHEAST (OUTPATIENT)
Dept: ADMINISTRATIVE | Facility: OTHER | Age: 30
End: 2020-12-03

## 2020-12-03 ENCOUNTER — TELEPHONE (OUTPATIENT)
Dept: ADMINISTRATIVE | Facility: CLINIC | Age: 30
End: 2020-12-03

## 2020-12-03 ENCOUNTER — RECORDS - HEALTHEAST (OUTPATIENT)
Dept: ULTRASOUND IMAGING | Facility: HOSPITAL | Age: 30
End: 2020-12-03

## 2020-12-03 DIAGNOSIS — O36.5990 PREGNANCY AFFECTED BY FETAL GROWTH RESTRICTION: ICD-10-CM

## 2020-12-03 DIAGNOSIS — O36.5990 MATERNAL CARE FOR OTHER KNOWN OR SUSPECTED POOR FETAL GROWTH, UNSPECIFIED TRIMESTER, NOT APPLICABLE OR UNSPECIFIED: ICD-10-CM

## 2020-12-03 NOTE — TELEPHONE ENCOUNTER
Diabetes Education Scheduling Outreach #1:    Call to patient to schedule. Left message with phone number to call to schedule.    Plan for 2nd outreach attempt within 1 business day.    Agnieszka Patel OnCall  Diabetes and Nutrition Scheduling

## 2020-12-05 ENCOUNTER — HOSPITAL ENCOUNTER (OUTPATIENT)
Dept: OBGYN | Facility: HOSPITAL | Age: 30
Discharge: HOME | End: 2020-12-05
Attending: OBSTETRICS & GYNECOLOGY | Admitting: OBSTETRICS & GYNECOLOGY

## 2020-12-05 ASSESSMENT — MIFFLIN-ST. JEOR: SCORE: 1355.46

## 2020-12-07 ENCOUNTER — OFFICE VISIT - HEALTHEAST (OUTPATIENT)
Dept: MATERNAL FETAL MEDICINE | Facility: HOSPITAL | Age: 30
End: 2020-12-07

## 2020-12-07 ENCOUNTER — RECORDS - HEALTHEAST (OUTPATIENT)
Dept: ULTRASOUND IMAGING | Facility: HOSPITAL | Age: 30
End: 2020-12-07

## 2020-12-07 ENCOUNTER — RECORDS - HEALTHEAST (OUTPATIENT)
Dept: ADMINISTRATIVE | Facility: OTHER | Age: 30
End: 2020-12-07

## 2020-12-07 DIAGNOSIS — O24.419 GESTATIONAL DIABETES MELLITUS (GDM), ANTEPARTUM, GESTATIONAL DIABETES METHOD OF CONTROL UNSPECIFIED: ICD-10-CM

## 2020-12-07 DIAGNOSIS — O40.3XX0 POLYHYDRAMNIOS IN THIRD TRIMESTER COMPLICATION, SINGLE OR UNSPECIFIED FETUS: ICD-10-CM

## 2020-12-07 DIAGNOSIS — O36.5990 MATERNAL CARE FOR OTHER KNOWN OR SUSPECTED POOR FETAL GROWTH, UNSPECIFIED TRIMESTER, NOT APPLICABLE OR UNSPECIFIED: ICD-10-CM

## 2020-12-07 DIAGNOSIS — O36.5990 PREGNANCY AFFECTED BY FETAL GROWTH RESTRICTION: ICD-10-CM

## 2020-12-10 ENCOUNTER — RECORDS - HEALTHEAST (OUTPATIENT)
Dept: ADMINISTRATIVE | Facility: OTHER | Age: 30
End: 2020-12-10

## 2020-12-10 ENCOUNTER — COMMUNICATION - HEALTHEAST (OUTPATIENT)
Dept: EDUCATION SERVICES | Facility: CLINIC | Age: 30
End: 2020-12-10

## 2020-12-10 ENCOUNTER — PATIENT OUTREACH (OUTPATIENT)
Dept: EDUCATION SERVICES | Facility: OTHER | Age: 30
End: 2020-12-10
Payer: COMMERCIAL

## 2020-12-10 ENCOUNTER — RECORDS - HEALTHEAST (OUTPATIENT)
Dept: ULTRASOUND IMAGING | Facility: HOSPITAL | Age: 30
End: 2020-12-10

## 2020-12-10 ENCOUNTER — OFFICE VISIT - HEALTHEAST (OUTPATIENT)
Dept: MATERNAL FETAL MEDICINE | Facility: HOSPITAL | Age: 30
End: 2020-12-10

## 2020-12-10 DIAGNOSIS — O36.5990 MATERNAL CARE FOR OTHER KNOWN OR SUSPECTED POOR FETAL GROWTH, UNSPECIFIED TRIMESTER, NOT APPLICABLE OR UNSPECIFIED: ICD-10-CM

## 2020-12-10 DIAGNOSIS — O24.419 GESTATIONAL DIABETES: ICD-10-CM

## 2020-12-10 DIAGNOSIS — O40.3XX0 POLYHYDRAMNIOS IN THIRD TRIMESTER COMPLICATION, SINGLE OR UNSPECIFIED FETUS: ICD-10-CM

## 2020-12-10 DIAGNOSIS — O24.419 GESTATIONAL DIABETES MELLITUS: Primary | ICD-10-CM

## 2020-12-10 DIAGNOSIS — O36.5990 PREGNANCY AFFECTED BY FETAL GROWTH RESTRICTION: ICD-10-CM

## 2020-12-10 PROCEDURE — G0108 DIAB MANAGE TRN  PER INDIV: HCPCS | Mod: 95

## 2020-12-10 NOTE — PROGRESS NOTES
Diabetes Self-Management Education & Support    SUBJECTIVE/OBJECTIVE:  Presents for education related to gestational diabetes.  Patient verbally consented to the telephone visit service today: yes      Accompanied by: Self  Diabetes management related comments/concerns: I know with my second child I had GDM, and it was hard to keep under control.  Gestational weeks: 29.4 weeks  [facility]ospital [facility] delivery: [facility]  Number of previous preganancies: 4  Had any babies over 9 lbs: No  Previously had Gestational Diabetes: Yes  Had Diabetes Education before: Yes  Previous insulin or other diabetes medication during that preganancy: No  Hypertension : No  Do you use tobacco products?: (!) Yes(cigg on and off)  Do you drink beer, wine or hard liquor?: No    Cultural Influences/Ethnic Background:      There were no vitals taken for this visit.    Weight gain 30 lbs at 29.4  weeks gestation.    Estimated Date of Delivery: 2021    Blood Glucose/Ketone Log: none, new dx.   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 Currently unsalted eggs + wheat bread x1-2 + banana  Lunch: Typically stir-oneil + meat ( chicken/pork/beef) + rice/noodle sometimes with fruit.Currently tuna + sunflower oil + ye, tomatoes lettuce and cilantro OR broiled chicken breast green beans OR chicken + tomato, lettuce and brocc  Dinner: Broiled chicken breat + green beans OR leftover tuna + salad  Snacks: banana or pear  Beverages: Water, Milk, Coffee  Biggest challenges to healthy eating: None  Pre- vitamin?: Yes  Supplements?:  No  Experiencing nausea?: No  Experiencing heartburn?: Yes    Healthy Coping:  Emotional response to diabetes: Ready to learn  Informal Support system:: Family  Stage of change: PREPARATION (Decided to change - considering how)    Current Management:  Taking medications for gestational diabetes?: No    ASSESSMENT:  Pt reports she understands she has had GDM in previous pregnancy, diet controlled she is worried on how to keep her BG under control.     Reviewed target blood glucose values, sharps disposal, diagnosis criteria for GDM and importance of good blood glucose management for health of mom and baby. Patient advised to call if 3 blood glucose elevated before returns next week. Instructed on ketone checking and told to call if unable to get ketones negative.  Pt verbalized understanding of concepts discussed and recommendations provided.        Discussed carbohydrate sources and impact on blood glucose. Reviewed basics of healthy eating and incorporating a variety of foods into meal plan. Instructed on carbohydrate counting and label reading and recommended patient consume 2-3 CHO for breakfast, 3-4 CHO for lunch and dinner and 1-2 CHO for each snack, 3 snacks a day. Educated on portion control and suggested plate method to balance meals.     Discussed importance of not going too low in carbohydrates since that may cause liver to produce excess glucose and contribute to elevated blood glucose readings and ketone formation. Encouraged eating breakfast within 1 hour of waking.  To also help prevent against ketone formation, protein was encouraged with meals and snacks, especially with the night snack. Reviewed benefits of exercising to help lower blood glucose and walking after meals, as tolerated and per MD approval, if seeing elevated blood glucose after a meal.  Pt verbalized understanding of concepts discussed and recommendations provided.    INTERVENTION:  Educational topics covered today:  What to expect  after delivery, Future testing for Type 2 diabetes (2 hour OGTT at 6 week post-partum check-up and annual fasting blood glucose level), Risk of GDM and planning ahead for future pregnancies, Recommended lifestyle interventions for reducing the risk of Type 2 Diabetes, When to Call a Diabetes Educator or OB Provider    Educational Materials provided today:  [facility]manda Preventing Diabetes    PLAN:  Check glucose 4 times daily.  Check ketones once a week when readings are consistently negative.  Continue with recommended physical activity.  Continue to follow recommended meal plan: 2-3 carbs at breakfast, 2-3 carbs at lunch, 2-3 carbs at supper, 1-2 carbs at snacks.  Follow consistent CHO meal plan, eat CHO and protein/fat at all meals/snacks.    Call/e-mail/MyChart message diabetes educator if 3 or more blood sugars are above the goal in 1 week or if ketones are positive.    Marcell Busby, MS, RD, LD, CDE       Time Spent: 30 minutes  Encounter Type: Individual    Any diabetes medication dose changes were made via the CDE Protocol and Collaborative Practice Agreement with the patient's OB/GYN provider. A copy of this encounter was shared with the provider.

## 2020-12-11 NOTE — PATIENT INSTRUCTIONS
GDM INSTRUCTION  Your 1 week follow-up Diabetes Education visit is scheduled on 12/17    1. Check blood sugar 4 times a day, before breakfast and 1 hour after the start of each meal.     2. Check urine ketones when you wake up every morning for 7 days. If negative everyday, reduce testing to once a week.    3. Follow the recommended meal plan: eat something every 2-3 hours, include protein/fat and carbohydrate at every meal and snack, have 2-3 carbs at breakfast, 3-4 carbs at lunch, 3-4 carbs at supper, 1-2 carbs at 3 snacks per day.     4. Add activity to every day, try walking or being active after each meal to help control blood sugar levels.    5.Call or send a [employer]t message to your educator if 3 or more blood sugars are above goal in 1 week, you have an elevated ketone result (trace or higher), or with questions or concerns.      Marcell Busby, MS, RD, LD, CDE         [facility] for [facility]:  For Your Diabetes Education or Nutrition Appointments Call:  [phone]   For Diabetes Education and Nutrition Related Questions:   Phone: [phone]  Send [employer] Message   If you need a medication refill please contact your pharmacy. Please allow 3 business days for your refills to be completed.

## 2020-12-14 ENCOUNTER — RECORDS - HEALTHEAST (OUTPATIENT)
Dept: ULTRASOUND IMAGING | Facility: HOSPITAL | Age: 30
End: 2020-12-14

## 2020-12-14 ENCOUNTER — OFFICE VISIT - HEALTHEAST (OUTPATIENT)
Dept: MATERNAL FETAL MEDICINE | Facility: HOSPITAL | Age: 30
End: 2020-12-14

## 2020-12-14 ENCOUNTER — RECORDS - HEALTHEAST (OUTPATIENT)
Dept: ADMINISTRATIVE | Facility: OTHER | Age: 30
End: 2020-12-14

## 2020-12-14 DIAGNOSIS — O36.5990 MATERNAL CARE FOR OTHER KNOWN OR SUSPECTED POOR FETAL GROWTH, UNSPECIFIED TRIMESTER, NOT APPLICABLE OR UNSPECIFIED: ICD-10-CM

## 2020-12-14 DIAGNOSIS — O36.5990 PREGNANCY AFFECTED BY FETAL GROWTH RESTRICTION: ICD-10-CM

## 2020-12-14 DIAGNOSIS — O40.3XX0 POLYHYDRAMNIOS IN THIRD TRIMESTER COMPLICATION, SINGLE OR UNSPECIFIED FETUS: ICD-10-CM

## 2020-12-14 DIAGNOSIS — J45.30 MILD PERSISTENT ASTHMA WITHOUT COMPLICATION: ICD-10-CM

## 2020-12-14 NOTE — TELEPHONE ENCOUNTER
[facility] phone call message- medication clarification/question:    Full Medication Name:  albuterol (PROAIR HFA/PROVENTIL HFA/VENTOLIN HFA) 108 (90 Base) MCG/ACT inhaler  Strength:     Have you contacted your pharmacy about this refill request?     If  Yes,  which pharmacy?    When did you contact the pharmacy?    Additional comments/concerns from call to pharmacy:    Reason for call to clinic: Calling to get refill on medication above, she states she is running low and not sure if an appt is needed. Her last appt was 08/2020. Told her if an appt is needed we will reach out to her. Told her it could take up to two business days for a response. Please call and advise.       Pharmacy confirmed as [id number] [place] [facility] [place], [place] - [address] [place] [address] AVE W: Yes    OK to leave a message on voice mail?     Primary language: English      needed? No    Call taken on December 14, 2020 at 2:22 PM by Frank Montanez

## 2020-12-16 RX ORDER — ALBUTEROL SULFATE 90 UG/1
2 AEROSOL, METERED RESPIRATORY (INHALATION) EVERY 4 HOURS PRN
Qty: 18 G | Refills: 4 | Status: SHIPPED | OUTPATIENT
Start: 2020-12-16 | End: 2021-05-11

## 2020-12-17 ENCOUNTER — RECORDS - HEALTHEAST (OUTPATIENT)
Dept: ULTRASOUND IMAGING | Facility: HOSPITAL | Age: 30
End: 2020-12-17

## 2020-12-17 ENCOUNTER — OFFICE VISIT - HEALTHEAST (OUTPATIENT)
Dept: MATERNAL FETAL MEDICINE | Facility: HOSPITAL | Age: 30
End: 2020-12-17

## 2020-12-17 ENCOUNTER — RECORDS - HEALTHEAST (OUTPATIENT)
Dept: ADMINISTRATIVE | Facility: OTHER | Age: 30
End: 2020-12-17

## 2020-12-17 DIAGNOSIS — O36.5990 PREGNANCY AFFECTED BY FETAL GROWTH RESTRICTION: ICD-10-CM

## 2020-12-17 DIAGNOSIS — O36.5990 MATERNAL CARE FOR OTHER KNOWN OR SUSPECTED POOR FETAL GROWTH, UNSPECIFIED TRIMESTER, NOT APPLICABLE OR UNSPECIFIED: ICD-10-CM

## 2020-12-21 ENCOUNTER — OFFICE VISIT - HEALTHEAST (OUTPATIENT)
Dept: MATERNAL FETAL MEDICINE | Facility: HOSPITAL | Age: 30
End: 2020-12-21

## 2020-12-21 ENCOUNTER — RECORDS - HEALTHEAST (OUTPATIENT)
Dept: ULTRASOUND IMAGING | Facility: HOSPITAL | Age: 30
End: 2020-12-21

## 2020-12-21 ENCOUNTER — RECORDS - HEALTHEAST (OUTPATIENT)
Dept: ADMINISTRATIVE | Facility: OTHER | Age: 30
End: 2020-12-21

## 2020-12-21 DIAGNOSIS — O36.5990 MATERNAL CARE FOR OTHER KNOWN OR SUSPECTED POOR FETAL GROWTH, UNSPECIFIED TRIMESTER, NOT APPLICABLE OR UNSPECIFIED: ICD-10-CM

## 2020-12-21 DIAGNOSIS — O36.5990 PREGNANCY AFFECTED BY FETAL GROWTH RESTRICTION: ICD-10-CM

## 2020-12-23 ENCOUNTER — OFFICE VISIT - HEALTHEAST (OUTPATIENT)
Dept: MATERNAL FETAL MEDICINE | Facility: HOSPITAL | Age: 30
End: 2020-12-23

## 2020-12-23 ENCOUNTER — RECORDS - HEALTHEAST (OUTPATIENT)
Dept: ULTRASOUND IMAGING | Facility: HOSPITAL | Age: 30
End: 2020-12-23

## 2020-12-23 ENCOUNTER — RECORDS - HEALTHEAST (OUTPATIENT)
Dept: ADMINISTRATIVE | Facility: OTHER | Age: 30
End: 2020-12-23

## 2020-12-23 DIAGNOSIS — O36.5990 MATERNAL CARE FOR OTHER KNOWN OR SUSPECTED POOR FETAL GROWTH, UNSPECIFIED TRIMESTER, NOT APPLICABLE OR UNSPECIFIED: ICD-10-CM

## 2020-12-23 DIAGNOSIS — O36.5990 PREGNANCY AFFECTED BY FETAL GROWTH RESTRICTION: ICD-10-CM

## 2020-12-27 ENCOUNTER — HOSPITAL ENCOUNTER (OUTPATIENT)
Dept: OBGYN | Facility: HOSPITAL | Age: 30
Discharge: HOME | End: 2020-12-27
Attending: OBSTETRICS & GYNECOLOGY | Admitting: OBSTETRICS & GYNECOLOGY

## 2020-12-28 ENCOUNTER — RECORDS - HEALTHEAST (OUTPATIENT)
Dept: ADMINISTRATIVE | Facility: OTHER | Age: 30
End: 2020-12-28

## 2020-12-28 ENCOUNTER — OFFICE VISIT - HEALTHEAST (OUTPATIENT)
Dept: MATERNAL FETAL MEDICINE | Facility: HOSPITAL | Age: 30
End: 2020-12-28

## 2020-12-28 ENCOUNTER — RECORDS - HEALTHEAST (OUTPATIENT)
Dept: ULTRASOUND IMAGING | Facility: HOSPITAL | Age: 30
End: 2020-12-28

## 2020-12-28 DIAGNOSIS — O36.5990 PREGNANCY AFFECTED BY FETAL GROWTH RESTRICTION: ICD-10-CM

## 2020-12-28 DIAGNOSIS — O36.5990 MATERNAL CARE FOR OTHER KNOWN OR SUSPECTED POOR FETAL GROWTH, UNSPECIFIED TRIMESTER, NOT APPLICABLE OR UNSPECIFIED: ICD-10-CM

## 2020-12-31 ENCOUNTER — RECORDS - HEALTHEAST (OUTPATIENT)
Dept: ULTRASOUND IMAGING | Facility: HOSPITAL | Age: 30
End: 2020-12-31

## 2020-12-31 ENCOUNTER — OFFICE VISIT - HEALTHEAST (OUTPATIENT)
Dept: MATERNAL FETAL MEDICINE | Facility: HOSPITAL | Age: 30
End: 2020-12-31

## 2020-12-31 ENCOUNTER — RECORDS - HEALTHEAST (OUTPATIENT)
Dept: ADMINISTRATIVE | Facility: OTHER | Age: 30
End: 2020-12-31

## 2020-12-31 DIAGNOSIS — O36.5990 MATERNAL CARE FOR OTHER KNOWN OR SUSPECTED POOR FETAL GROWTH, UNSPECIFIED TRIMESTER, NOT APPLICABLE OR UNSPECIFIED: ICD-10-CM

## 2020-12-31 DIAGNOSIS — O36.5990 PREGNANCY AFFECTED BY FETAL GROWTH RESTRICTION: ICD-10-CM

## 2021-01-04 ENCOUNTER — RECORDS - HEALTHEAST (OUTPATIENT)
Dept: ULTRASOUND IMAGING | Facility: HOSPITAL | Age: 31
End: 2021-01-04

## 2021-01-04 ENCOUNTER — RECORDS - HEALTHEAST (OUTPATIENT)
Dept: ADMINISTRATIVE | Facility: OTHER | Age: 31
End: 2021-01-04

## 2021-01-04 ENCOUNTER — OFFICE VISIT - HEALTHEAST (OUTPATIENT)
Dept: MATERNAL FETAL MEDICINE | Facility: HOSPITAL | Age: 31
End: 2021-01-04

## 2021-01-04 DIAGNOSIS — O36.5990 MATERNAL CARE FOR OTHER KNOWN OR SUSPECTED POOR FETAL GROWTH, UNSPECIFIED TRIMESTER, NOT APPLICABLE OR UNSPECIFIED: ICD-10-CM

## 2021-01-04 DIAGNOSIS — O36.5990 PREGNANCY AFFECTED BY FETAL GROWTH RESTRICTION: ICD-10-CM

## 2021-01-07 ENCOUNTER — OFFICE VISIT - HEALTHEAST (OUTPATIENT)
Dept: MATERNAL FETAL MEDICINE | Facility: HOSPITAL | Age: 31
End: 2021-01-07

## 2021-01-07 ENCOUNTER — RECORDS - HEALTHEAST (OUTPATIENT)
Dept: ULTRASOUND IMAGING | Facility: HOSPITAL | Age: 31
End: 2021-01-07

## 2021-01-07 ENCOUNTER — HOSPITAL ENCOUNTER (OUTPATIENT)
Dept: OBGYN | Facility: HOSPITAL | Age: 31
Discharge: HOME | End: 2021-01-07
Attending: ADVANCED PRACTICE MIDWIFE | Admitting: OBSTETRICS & GYNECOLOGY

## 2021-01-07 ENCOUNTER — RECORDS - HEALTHEAST (OUTPATIENT)
Dept: ADMINISTRATIVE | Facility: OTHER | Age: 31
End: 2021-01-07

## 2021-01-07 DIAGNOSIS — O36.5990 MATERNAL CARE FOR OTHER KNOWN OR SUSPECTED POOR FETAL GROWTH, UNSPECIFIED TRIMESTER, NOT APPLICABLE OR UNSPECIFIED: ICD-10-CM

## 2021-01-07 DIAGNOSIS — O36.5990 PREGNANCY AFFECTED BY FETAL GROWTH RESTRICTION: ICD-10-CM

## 2021-01-11 ENCOUNTER — RECORDS - HEALTHEAST (OUTPATIENT)
Dept: ADMINISTRATIVE | Facility: OTHER | Age: 31
End: 2021-01-11

## 2021-01-11 ENCOUNTER — RECORDS - HEALTHEAST (OUTPATIENT)
Dept: ULTRASOUND IMAGING | Facility: HOSPITAL | Age: 31
End: 2021-01-11

## 2021-01-11 ENCOUNTER — OFFICE VISIT - HEALTHEAST (OUTPATIENT)
Dept: MATERNAL FETAL MEDICINE | Facility: HOSPITAL | Age: 31
End: 2021-01-11

## 2021-01-11 DIAGNOSIS — O36.5990 PREGNANCY AFFECTED BY FETAL GROWTH RESTRICTION: ICD-10-CM

## 2021-01-11 DIAGNOSIS — O36.5990 MATERNAL CARE FOR OTHER KNOWN OR SUSPECTED POOR FETAL GROWTH, UNSPECIFIED TRIMESTER, NOT APPLICABLE OR UNSPECIFIED: ICD-10-CM

## 2021-01-14 ENCOUNTER — RECORDS - HEALTHEAST (OUTPATIENT)
Dept: ADMINISTRATIVE | Facility: OTHER | Age: 31
End: 2021-01-14

## 2021-01-14 ENCOUNTER — OFFICE VISIT - HEALTHEAST (OUTPATIENT)
Dept: MATERNAL FETAL MEDICINE | Facility: HOSPITAL | Age: 31
End: 2021-01-14

## 2021-01-14 ENCOUNTER — RECORDS - HEALTHEAST (OUTPATIENT)
Dept: ULTRASOUND IMAGING | Facility: HOSPITAL | Age: 31
End: 2021-01-14

## 2021-01-14 DIAGNOSIS — O36.5990 PREGNANCY AFFECTED BY FETAL GROWTH RESTRICTION: ICD-10-CM

## 2021-01-14 DIAGNOSIS — O36.5990 MATERNAL CARE FOR OTHER KNOWN OR SUSPECTED POOR FETAL GROWTH, UNSPECIFIED TRIMESTER, NOT APPLICABLE OR UNSPECIFIED: ICD-10-CM

## 2021-01-15 ENCOUNTER — HEALTH MAINTENANCE LETTER (OUTPATIENT)
Age: 31
End: 2021-01-15

## 2021-01-18 ENCOUNTER — RECORDS - HEALTHEAST (OUTPATIENT)
Dept: ULTRASOUND IMAGING | Facility: HOSPITAL | Age: 31
End: 2021-01-18

## 2021-01-18 ENCOUNTER — OFFICE VISIT - HEALTHEAST (OUTPATIENT)
Dept: MATERNAL FETAL MEDICINE | Facility: HOSPITAL | Age: 31
End: 2021-01-18

## 2021-01-18 ENCOUNTER — RECORDS - HEALTHEAST (OUTPATIENT)
Dept: ADMINISTRATIVE | Facility: OTHER | Age: 31
End: 2021-01-18

## 2021-01-18 DIAGNOSIS — O36.5990 PREGNANCY AFFECTED BY FETAL GROWTH RESTRICTION: ICD-10-CM

## 2021-01-18 DIAGNOSIS — O36.5990 MATERNAL CARE FOR OTHER KNOWN OR SUSPECTED POOR FETAL GROWTH, UNSPECIFIED TRIMESTER, NOT APPLICABLE OR UNSPECIFIED: ICD-10-CM

## 2021-01-19 ENCOUNTER — AMBULATORY - HEALTHEAST (OUTPATIENT)
Dept: OBGYN | Facility: CLINIC | Age: 31
End: 2021-01-19

## 2021-01-19 DIAGNOSIS — Z01.812 ENCOUNTER FOR SCREENING LABORATORY TESTING FOR COVID-19 VIRUS IN ASYMPTOMATIC PATIENT: ICD-10-CM

## 2021-01-19 DIAGNOSIS — Z11.52 ENCOUNTER FOR SCREENING LABORATORY TESTING FOR COVID-19 VIRUS IN ASYMPTOMATIC PATIENT: ICD-10-CM

## 2021-01-22 ENCOUNTER — RECORDS - HEALTHEAST (OUTPATIENT)
Dept: ULTRASOUND IMAGING | Facility: HOSPITAL | Age: 31
End: 2021-01-22

## 2021-01-22 ENCOUNTER — OFFICE VISIT - HEALTHEAST (OUTPATIENT)
Dept: MATERNAL FETAL MEDICINE | Facility: HOSPITAL | Age: 31
End: 2021-01-22

## 2021-01-22 ENCOUNTER — RECORDS - HEALTHEAST (OUTPATIENT)
Dept: ADMINISTRATIVE | Facility: OTHER | Age: 31
End: 2021-01-22

## 2021-01-22 ENCOUNTER — HOSPITAL ENCOUNTER (OUTPATIENT)
Dept: OBGYN | Facility: HOSPITAL | Age: 31
Discharge: HOME | End: 2021-01-22
Attending: OBSTETRICS & GYNECOLOGY | Admitting: OBSTETRICS & GYNECOLOGY

## 2021-01-22 DIAGNOSIS — O23.599 BACTERIAL VAGINOSIS IN PREGNANCY: ICD-10-CM

## 2021-01-22 DIAGNOSIS — O36.5990 PREGNANCY AFFECTED BY FETAL GROWTH RESTRICTION: ICD-10-CM

## 2021-01-22 DIAGNOSIS — Z3A.35 35 WEEKS GESTATION OF PREGNANCY: ICD-10-CM

## 2021-01-22 DIAGNOSIS — B96.89 BACTERIAL VAGINOSIS IN PREGNANCY: ICD-10-CM

## 2021-01-22 DIAGNOSIS — O36.5990 MATERNAL CARE FOR OTHER KNOWN OR SUSPECTED POOR FETAL GROWTH, UNSPECIFIED TRIMESTER, NOT APPLICABLE OR UNSPECIFIED: ICD-10-CM

## 2021-01-22 LAB
CLUE CELLS: ABNORMAL
RUPTURE OF FETAL MEMBRANES BY ROM PLUS: NEGATIVE
TRICHOMONAS, WET PREP: ABNORMAL
YEAST, WET PREP: ABNORMAL

## 2021-01-22 ASSESSMENT — MIFFLIN-ST. JEOR: SCORE: 1350.92

## 2021-01-24 ENCOUNTER — HOSPITAL ENCOUNTER (OUTPATIENT)
Dept: OBGYN | Facility: HOSPITAL | Age: 31
Discharge: HOME | End: 2021-01-24
Attending: OBSTETRICS & GYNECOLOGY | Admitting: OBSTETRICS & GYNECOLOGY

## 2021-01-24 ASSESSMENT — MIFFLIN-ST. JEOR: SCORE: 1350.92

## 2021-01-25 ENCOUNTER — OFFICE VISIT - HEALTHEAST (OUTPATIENT)
Dept: MATERNAL FETAL MEDICINE | Facility: HOSPITAL | Age: 31
End: 2021-01-25

## 2021-01-25 ENCOUNTER — RECORDS - HEALTHEAST (OUTPATIENT)
Dept: ULTRASOUND IMAGING | Facility: HOSPITAL | Age: 31
End: 2021-01-25

## 2021-01-25 ENCOUNTER — RECORDS - HEALTHEAST (OUTPATIENT)
Dept: ADMINISTRATIVE | Facility: OTHER | Age: 31
End: 2021-01-25

## 2021-01-25 DIAGNOSIS — O36.5990 PREGNANCY AFFECTED BY FETAL GROWTH RESTRICTION: ICD-10-CM

## 2021-01-25 DIAGNOSIS — O36.5990 MATERNAL CARE FOR OTHER KNOWN OR SUSPECTED POOR FETAL GROWTH, UNSPECIFIED TRIMESTER, NOT APPLICABLE OR UNSPECIFIED: ICD-10-CM

## 2021-01-28 ENCOUNTER — OFFICE VISIT - HEALTHEAST (OUTPATIENT)
Dept: MATERNAL FETAL MEDICINE | Facility: HOSPITAL | Age: 31
End: 2021-01-28

## 2021-01-28 ENCOUNTER — HOSPITAL ENCOUNTER (OUTPATIENT)
Dept: OBGYN | Facility: HOSPITAL | Age: 31
Discharge: HOME | End: 2021-01-28
Attending: OBSTETRICS & GYNECOLOGY | Admitting: OBSTETRICS & GYNECOLOGY

## 2021-01-28 ENCOUNTER — RECORDS - HEALTHEAST (OUTPATIENT)
Dept: ULTRASOUND IMAGING | Facility: HOSPITAL | Age: 31
End: 2021-01-28

## 2021-01-28 ENCOUNTER — AMBULATORY - HEALTHEAST (OUTPATIENT)
Dept: FAMILY MEDICINE | Facility: CLINIC | Age: 31
End: 2021-01-28

## 2021-01-28 ENCOUNTER — RECORDS - HEALTHEAST (OUTPATIENT)
Dept: ADMINISTRATIVE | Facility: OTHER | Age: 31
End: 2021-01-28

## 2021-01-28 DIAGNOSIS — O36.5990 PREGNANCY AFFECTED BY FETAL GROWTH RESTRICTION: ICD-10-CM

## 2021-01-28 DIAGNOSIS — Z01.812 ENCOUNTER FOR SCREENING LABORATORY TESTING FOR COVID-19 VIRUS IN ASYMPTOMATIC PATIENT: ICD-10-CM

## 2021-01-28 DIAGNOSIS — Z11.52 ENCOUNTER FOR SCREENING LABORATORY TESTING FOR COVID-19 VIRUS IN ASYMPTOMATIC PATIENT: ICD-10-CM

## 2021-01-28 DIAGNOSIS — O36.5990 MATERNAL CARE FOR OTHER KNOWN OR SUSPECTED POOR FETAL GROWTH, UNSPECIFIED TRIMESTER, NOT APPLICABLE OR UNSPECIFIED: ICD-10-CM

## 2021-01-28 ASSESSMENT — MIFFLIN-ST. JEOR: SCORE: 1359.99

## 2021-01-29 ENCOUNTER — COMMUNICATION - HEALTHEAST (OUTPATIENT)
Dept: SCHEDULING | Facility: CLINIC | Age: 31
End: 2021-01-29

## 2021-01-29 LAB
SARS-COV-2 PCR COMMENT: NORMAL
SARS-COV-2 RNA SPEC QL NAA+PROBE: NEGATIVE
SARS-COV-2 VIRUS SPECIMEN SOURCE: NORMAL

## 2021-02-01 ENCOUNTER — ANESTHESIA - HEALTHEAST (OUTPATIENT)
Dept: OBGYN | Facility: HOSPITAL | Age: 31
End: 2021-02-01

## 2021-02-02 ENCOUNTER — HOME CARE/HOSPICE - HEALTHEAST (OUTPATIENT)
Dept: HOME HEALTH SERVICES | Facility: HOME HEALTH | Age: 31
End: 2021-02-02

## 2021-02-03 ENCOUNTER — TELEPHONE (OUTPATIENT)
Dept: FAMILY MEDICINE | Facility: CLINIC | Age: 31
End: 2021-02-03

## 2021-02-03 NOTE — TELEPHONE ENCOUNTER
Left message for Piero to call clinic and register , schedule for  check for repeat bilirubin as per Dr DAISY Frazier. RLannabella RN

## 2021-05-07 DIAGNOSIS — J45.30 MILD PERSISTENT ASTHMA WITHOUT COMPLICATION: ICD-10-CM

## 2021-05-07 RX ORDER — ALBUTEROL SULFATE 90 UG/1
2 AEROSOL, METERED RESPIRATORY (INHALATION) EVERY 4 HOURS PRN
Qty: 18 G | Refills: 4 | OUTPATIENT
Start: 2021-05-07

## 2021-05-07 NOTE — TELEPHONE ENCOUNTER
Message to physician:     Date of last visit: 8/6/20    Date of next visit if scheduled: none    Last Comprehensive Metabolic Panel:  No results found for: NA, POTASSIUM, CHLORIDE, CO2, ANIONGAP, GLC, BUN, CR, GFRESTIMATED, LLOYD    BP Readings from Last 3 Encounters:   08/01/19 125/84   05/16/19 108/75   05/13/19 112/69       No results found for: A1C             Please complete refill and CLOSE ENCOUNTER.  Closing the encounter signifies the refill is complete.

## 2021-05-07 NOTE — TELEPHONE ENCOUNTER
Appointment needed to assess asthma control prior to additional refills being sent. Appointment is scheduled for 5/10/2021.    Felecia Mauro MD

## 2021-05-11 ENCOUNTER — OFFICE VISIT (OUTPATIENT)
Dept: FAMILY MEDICINE | Facility: CLINIC | Age: 31
End: 2021-05-11
Payer: COMMERCIAL

## 2021-05-11 VITALS
HEIGHT: 59 IN | WEIGHT: 138.75 LBS | RESPIRATION RATE: 22 BRPM | BODY MASS INDEX: 27.97 KG/M2 | SYSTOLIC BLOOD PRESSURE: 111 MMHG | DIASTOLIC BLOOD PRESSURE: 76 MMHG | OXYGEN SATURATION: 99 % | TEMPERATURE: 98.4 F | HEART RATE: 56 BPM

## 2021-05-11 DIAGNOSIS — J45.30 MILD PERSISTENT ASTHMA WITHOUT COMPLICATION: Primary | ICD-10-CM

## 2021-05-11 PROCEDURE — 99214 OFFICE O/P EST MOD 30 MIN: CPT | Mod: GC | Performed by: STUDENT IN AN ORGANIZED HEALTH CARE EDUCATION/TRAINING PROGRAM

## 2021-05-11 RX ORDER — FLUTICASONE PROPIONATE 110 UG/1
2 AEROSOL, METERED RESPIRATORY (INHALATION) 2 TIMES DAILY
Qty: 12 G | Refills: 3 | Status: SHIPPED | OUTPATIENT
Start: 2021-05-11 | End: 2022-01-19

## 2021-05-11 RX ORDER — ALBUTEROL SULFATE 90 UG/1
2 AEROSOL, METERED RESPIRATORY (INHALATION) EVERY 4 HOURS PRN
Qty: 18 G | Refills: 4 | Status: SHIPPED | OUTPATIENT
Start: 2021-05-11 | End: 2022-01-26

## 2021-05-11 ASSESSMENT — ASTHMA QUESTIONNAIRES
QUESTION_2 LAST FOUR WEEKS HOW OFTEN HAVE YOU HAD SHORTNESS OF BREATH: ONCE OR TWICE A WEEK
QUESTION_4 LAST FOUR WEEKS HOW OFTEN HAVE YOU USED YOUR RESCUE INHALER OR NEBULIZER MEDICATION (SUCH AS ALBUTEROL): TWO OR THREE TIMES PER WEEK
ACT_TOTALSCORE: 18
QUESTION_5 LAST FOUR WEEKS HOW WOULD YOU RATE YOUR ASTHMA CONTROL: SOMEWHAT CONTROLLED
QUESTION_3 LAST FOUR WEEKS HOW OFTEN DID YOUR ASTHMA SYMPTOMS (WHEEZING, COUGHING, SHORTNESS OF BREATH, CHEST TIGHTNESS OR PAIN) WAKE YOU UP AT NIGHT OR EARLIER THAN USUAL IN THE MORNING: ONCE OR TWICE
QUESTION_1 LAST FOUR WEEKS HOW MUCH OF THE TIME DID YOUR ASTHMA KEEP YOU FROM GETTING AS MUCH DONE AT WORK, SCHOOL OR AT HOME: A LITTLE OF THE TIME

## 2021-05-11 ASSESSMENT — MIFFLIN-ST. JEOR: SCORE: 1250

## 2021-05-11 NOTE — PROGRESS NOTES
CHIEF COMPLAINT                                                      Chief Complaint   Patient presents with     Refill Request     for maninderhlbrittany     SUBJECTIVE:                                                    Piero Villarreal is a 31 year old year old female who presents to clinic today for the following health issues:    Asthma Follow-Up    Was ACT completed today?    Yes    ACT Total Scores 5/11/2021   ACT TOTAL SCORE -   ASTHMA ER VISITS -   ASTHMA HOSPITALIZATIONS -   ACT TOTAL SCORE (Goal Greater than or Equal to 20) 18   In the past 12 months, how many times did you visit the emergency room for your asthma without being admitted to the hospital? 0   In the past 12 months, how many times were you hospitalized overnight because of your asthma? 0       How many days per week do you miss taking your asthma controller medication?  I do not have an asthma controller medication    Please describe any recent triggers for your asthma: dust mites, pollens, animal dander, strong odors and fumes and cold air    Have you had any Emergency Room Visits, Urgent Care Visits, or Hospital Admissions since your last office visit?  No    How many servings of fruits and vegetables do you eat daily?  2-3    On average, how many sweetened beverages do you drink each day (Examples: soda, juice, sweet tea, etc.  Do NOT count diet or artificially sweetened beverages)?   1    How many days per week do you exercise enough to make your heart beat faster? 4    How many minutes a day do you exercise enough to make your heart beat faster? 20 - 29    How many days per week do you miss taking your medication? 0    Patient is an established patient of this clinic.  ----------------------------------------------------------------------------------------------------------------------  Patient Active Problem List   Diagnosis     Mild persistent asthma     Pap smear for cervical cancer screening     Prior pregnancy with congenital cardiac defect,  antepartum     Tobacco use disorder     Adjustment disorder with anxious mood     Palpitations     Past Surgical History:   Procedure Laterality Date     NO HISTORY OF SURGERY         Social History     Tobacco Use     Smoking status: Former Smoker     Packs/day: 0.50     Types: Cigarettes     Smokeless tobacco: Never Used   Substance Use Topics     Alcohol use: Not Currently     Alcohol/week: 0.0 standard drinks     Frequency: 2-4 times a month     Drinks per session: 1 or 2     Binge frequency: Less than monthly     Comment: ocassionally     Family History   Problem Relation Age of Onset     Asthma Son      Diabetes No family hx of      Coronary Artery Disease No family hx of      Hypertension No family hx of      Breast Cancer No family hx of      Cancer - colorectal No family hx of      Ovarian Cancer No family hx of      Prostate Cancer No family hx of      Other Cancer No family hx of      Mental Illness No family hx of      Depression/Anxiety No family hx of      Cerebrovascular Disease No family hx of      Thyroid Disease No family hx of      Obesity No family hx of      Thyroid Disease No family hx of      Genetic Disorder No family hx of      Osteoporosis No family hx of      Anesthesia Reaction No family hx of      Substance Abuse No family hx of      Mental Illness No family hx of      Anxiety Disorder No family hx of      Depression No family hx of      Colon Cancer No family hx of      Hyperlipidemia No family hx of          Problem list and past medical, surgical, social, and family histories reviewed & adjusted, as indicated.    Current Outpatient Medications   Medication Sig Dispense Refill     albuterol (PROAIR HFA/PROVENTIL HFA/VENTOLIN HFA) 108 (90 Base) MCG/ACT inhaler Inhale 2 puffs into the lungs every 4 hours as needed for shortness of breath / dyspnea or wheezing 18 g 4     albuterol (PROVENTIL) (2.5 MG/3ML) 0.083% neb solution Take 1 vial (2.5 mg) by nebulization every 6 hours as needed for  "shortness of breath / dyspnea or wheezing 1 Box 4     acetone urine (KETOSTIX) test strip Check with each morning urine until you have 7 negative results consecutively, then reduce to checking once a week. (Patient not taking: Reported on 5/11/2021) 100 each 4     blood glucose (NO BRAND SPECIFIED) lancets standard Use to test blood sugar 4 times daily or as directed. (Patient not taking: Reported on 5/11/2021) 100 each 4     blood glucose (NO BRAND SPECIFIED) test strip Use to test blood sugar 4 times daily or as directed. (Patient not taking: Reported on 5/11/2021) 150 each 11     blood glucose monitoring (NO BRAND SPECIFIED) meter device kit Use to test blood sugar 4 times daily or as directed. (Patient not taking: Reported on 5/11/2021) 1 kit 0     Prenatal Vit-Fe Fumarate-FA (PRENATAL MULTIVITAMIN W/IRON) 27-0.8 MG tablet Take 1 tablet by mouth daily (Patient not taking: Reported on 5/11/2021) 90 tablet 3     Medication list reviewed and updated as indicated.    Allergies   Allergen Reactions     Cats      Allergies reviewed and updated as indicated.  ----------------------------------------------------------------------------------------------------------------------  ROS:  Constitutional, HEENT, cardiovascular, pulmonary, GI, musculoskeletal, neuro, skin, and psych systems are negative, except as otherwise noted.    OBJECTIVE:     /76   Pulse 56   Temp 98.4  F (36.9  C)   Resp 22   Ht 1.499 m (4' 11\")   Wt 62.9 kg (138 lb 12 oz)   SpO2 99%   BMI 28.02 kg/m    Body mass index is 28.02 kg/m .  Exam:  Constitutional: healthy, alert and no distress  Head: Normocephalic. No masses, lesions, tenderness or abnormalities  Neck: Neck supple. No adenopathy. Thyroid symmetric, normal size,, Carotids without bruits.  Cardiovascular: negative, PMI normal. No lifts, heaves, or thrills. RRR. No murmurs, clicks gallops or rub  Respiratory: negative, Percussion normal. Good diaphragmatic excursion. Lungs clear.  " No wheezing with forced expiration.    Gastrointestinal: Abdomen soft, non-tender. BS normal. No masses, organomegaly  Musculoskeletal: extremities normal- no gross deformities noted, gait normal and normal muscle tone  Skin: no suspicious lesions or rashes  Neurologic: Gait normal. Reflexes normal and symmetric. Sensation grossly WNL.  Psychiatric: mentation appears normal and affect normal/bright    Diagnostic Test Results:  none     ASSESSMENT/PLAN:     (J45.30) Mild persistent asthma without complication  (primary encounter diagnosis)  -Patient presents for refill of asthma medications at this time  -On ACT score she is 18.  At this time we would like to add on a daily ACS  -It looks like her insurance should cover Flovent HFA, we will start this daily  -Given appropriate use instructions and will discuss appropriate use with pharmacist  -Will follow up in one month for reevaluation and ensure improvement and to discuss routine cares (recommended annual exam shortly)  -Will return sooner for other acute concerns     There are no discontinued medications.    Options for treatment and follow-up care were reviewed with the patient and/or guardian. Piero Villarreal and/or guardian engaged in the decision making process and verbalized understanding of the options discussed and agreed with the final plan    Precepted with Dr. Blanton.    Jorje Vaz MD on 5/11/2021 at 4:56 PM

## 2021-05-12 ASSESSMENT — ASTHMA QUESTIONNAIRES: ACT_TOTALSCORE: 18

## 2021-05-12 NOTE — PROGRESS NOTES
Preceptor Attestation:  Patient's case reviewed and discussed with the resident, Jorje Vaz MD, and I personally evaluated the patient. I agree with written assessment and plan of care.    Supervising Physician:  Tanja Blanton MD   [facility]

## 2021-05-26 VITALS — SYSTOLIC BLOOD PRESSURE: 97 MMHG | DIASTOLIC BLOOD PRESSURE: 62 MMHG | HEART RATE: 68 BPM

## 2021-05-27 VITALS — SYSTOLIC BLOOD PRESSURE: 101 MMHG | DIASTOLIC BLOOD PRESSURE: 68 MMHG

## 2021-05-27 VITALS — HEART RATE: 74 BPM | DIASTOLIC BLOOD PRESSURE: 72 MMHG | SYSTOLIC BLOOD PRESSURE: 113 MMHG

## 2021-06-05 VITALS — HEIGHT: 59 IN | WEIGHT: 161 LBS | BODY MASS INDEX: 32.46 KG/M2

## 2021-06-05 VITALS — WEIGHT: 163 LBS | HEIGHT: 59 IN | BODY MASS INDEX: 32.86 KG/M2

## 2021-06-05 VITALS — HEIGHT: 59 IN | WEIGHT: 162 LBS | BODY MASS INDEX: 32.66 KG/M2

## 2021-06-05 VITALS — BODY MASS INDEX: 32.46 KG/M2 | WEIGHT: 161 LBS | HEIGHT: 59 IN

## 2021-06-11 NOTE — PROGRESS NOTES
[facility]  Genetic Counseling Consult    Patient:  Piero Villarreal YOB: 1990   Date of Service:  2020      Piero Villarreal was seen at the [facility] for genetic consultation as part of her appointment for comprehensive ultrasound. 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 The patient was unaccompanied to today's consult.       Impression/Plan:   1. Piero had a cell-free fetal DNA test (Panorama) earlier in pregnancy, which was normal. Piero had an AFP screen earlier in pregnancy, which was normal. She also underwent an NT ultrasound in the first trimester, the results of which were reportedly normal.     2. Piero had a comprehensive (level II) ultrasound today. Please see the ultrasound report for further details. Adequate views of the fetal heart/thorax, spine, and abdomen could not be obtained today, however the rest of the fetal anatomy appeared unremarkable. Piero is scheduled to return to our clinic for follow-up comprehensive ultrasound on 10/19/2020.     3. Piero declines genetic amniocentesis during today's consult.     4. Piero explicitly expressed feeling very anxious with the current pregnancy. She shared that should a significant congenital anomaly be identified on ultrasound, she would consider terminating the current pregnancy.    5. We briefly reviewed the option of fetal echocardiogram in the current pregnancy given the patient's pregnancy history. Piero was encouraged to review this screening option with her primary OB to discuss the utility of this screen.     Pregnancy History:   /Parity:     Age at Delivery: 30 y.o.  GURJIT: 2021, by Last Menstrual Period  Gestational Age: 18w1d    No  significant complications or bleeding was reported in the current pregnancy. Piero does report currently smoking 4 cigarettes per day in the pregnancy, sharing that it decreases her anxiety. We briefly reviewed risks to the current pregnancy from maternal tobacco use, and the patient verbalized understanding.     The patient reports currently taking prenatal vitamins. She reports taking Tylenol and her inhaler as needed in the current pregnancy.    Piero hernandez pregnancy history is significant for:  o 2009: 40w2d, male, alive and well. Post delivery preeclampsia.   o 10/2013: mono/di twins- twin A identified to have ASD, hypoplastic left heart, and double outlet right ventricle. 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 This pregnancy was terminated at 21 weeks gestation with elective D&E. No genetic testing was performed during the pregnancy or postnatally. No autopsy was conducted.   o 12/2016: 39w2d, male, alive and well. He was identified to have a thickened nuchal fold on anatomy ultrasound at 18w1d (5.73mm).   o 2019: SAB identified at 10 weeks gestation measuring at 7 weeks gestation. No cause for the miscarriage was reportedly identified.     Medical History:   Piero hernandez reported medical history is not expected to impact pregnancy management or risks to fetal development.       Family History:   A three-generation pedigree was obtained in 2016 and was updated during today's consult. Please see the updated pedigree scanned under the  Media  tab in the patient's chart. There were no significant updates to the family history per Piero's report. The remainder of the family history remains negative for multiple miscarriages, stillbirths, other known birth defects,intellectual disabilities, known genetic conditions, and consanguinity.    Today, we reviewed the patient's pregnancy history, specifically her 2013 twin gestation. We reviewed that we still do not know what could  have caused the twin's birth defects. In many cases of congenital anomalies, there are underlying genetic etiologies that appear sporadic and isolated in pregnancies, while in other cases, there may be an inherited component to birth defects. We reviewed that the risk of a congenital heart defect in the current pregnancy may range from 3-22% and can vary in the degree of severity. We also reviewed the possibility of undergoing a fetal echocardiogram in the current pregnancy based on Piero's pregnancy history. The patient expressed interest in a fetal echocardiogram in the current pregnancy and was encouraged to speak to her primary OB provider about the utility of this screen. (Bairon et al. Journal of Pediatrics, 2016)     Piero and her  Arley have both undergone genetic chromosome analysis. Piero received 46, XX results and Arley received 46, XY results, consistent with presumably healthy individuals. This testing does not rule out the possibility that the couple may carry single-gene causes of heart defects or congenital pulmonary airway malformations. However, these results do make it unlikely that the affected twins inherited a structural chromosomal abnormality.        Carrier Screening:   The patient reports that she and the father of the pregnancy have  ([place]) ancestry:        The hemoglobinopathies are a group of genetic blood diseases that occur with increased frequency in individuals of  ancestry and carrier screening for these conditions is available.  Carrier screening for the hemoglobinopathies includes a CBC with red blood cell indices, a ferritin level, and a quantitative hemoglobin electrophoresis or HPLC.  In addition,  screening in the [facility] includes many of the hemoglobinopathies.      Expanded carrier screening for mutations in a large panel of genes associated with autosomal recessive conditions including cystic fibrosis, spinal muscular atrophy, and others, is now  available.      Carrier screening was reviewed with the couple back in 2016. At that time, the patient was unsure about whether or not to pursue expanded carrier screening. I reviewed with Piero her options for carrier screening today. 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 The patient was again not certain about whether to pursue carrier screening. She was provided with a brochure about her testing options through [employer] and also provided with my contact information if she has questions or wishes to pursue screening in the future.       Risk Assessment for Chromosome Conditions:   The risk for fetal chromosome abnormalities increases with maternal age. We discussed specific features of common chromosome abnormalities, including Down syndrome, trisomy 13, trisomy 18, and sex chromosome trisomies.      - At age 30 at midtrimester, the risk to have a baby with Down syndrome is 1 in 690.     - At age 30 at midtrimester, the risk to have a baby with any chromosome abnormality is 1 in 345.     - At age 30 at delivery, the risk to have a baby with Down syndrome is 1 in 952.    - At age 30 at delivery, the risk to have a baby with any chromosome abnormality is 1 in 384.       Piero had maternal serum screening earlier in pregnancy.     Non-invasive Prenatal Testing (NIPT)    Maternal plasma cell-free DNA testing    Screens for fetal trisomy 21, trisomy 13, trisomy 18, and sex chromosome aneuploidy    First trimester ultrasound with nuchal translucency and nasal bone assessment was within normal limits.    Piero had a Panorama NIPT via [employer] earlier in pregnancy; we reviewed the results today, which are normal for chromosome 13, chromosome 18, chromosome 21, and the sex chromosomes (no aneuploidy  detected)    The patient is aware of the predicted genetic sex of the current pregnancy (XX, female)    Given the accuracy of this test, these results greatly decrease the chance for certain fetal chromosome abnormalities    We discussed the limitations of normal NIPT results    MSAFP (after 15 weeks for open neural tube defect screening) results were within normal limits, which decreased the risk of an open neural tube defect in the pregnancy to < 1 in 5,000.       Testing Options:   We discussed the following options:     Genetic Amniocentesis    Invasive procedure typically performed in the second trimester by which amniotic fluid is obtained for the purpose of chromosome analysis and/or other prenatal genetic analysis    Diagnostic results; >99% sensitivity for fetal chromosome abnormalities    AFAFP measurement tests for open neural tube defects         Comprehensive (Level II) ultrasound: Detailed ultrasound performed between 18-22 weeks gestation to screen for major birth defects and markers for aneuploidy.    We reviewed the benefits and limitations of this testing.  Screening tests provide a risk assessment specific to the pregnancy for certain fetal chromosome abnormalities, but cannot definitively diagnose or exclude a fetal chromosome abnormality.  Follow-up genetic counseling and consideration of diagnostic testing is recommended with any abnormal screening result.     Diagnostic tests carry inherent risks- including risk of miscarriage- that require careful consideration.  These tests can detect fetal chromosome abnormalities with greater than 99% certainty.  Results can be compromised by maternal cell contamination or mosaicism, and are limited by the resolution of cytogenetic G-banding technology.  There is no screening nor diagnostic test that can detect all forms of birth defects or mental disability.    It was a pleasure to be involved with [facility] care. Face-to-face time of the meeting was 30  minutes.      Jia Sheets MS, [facility]  Genetic Counselor  [facility]  Maternal Fetal Medicine  Ph: [phone] ([facility])  Ph: [phone] ([place])  [email or web address]

## 2021-06-11 NOTE — PROGRESS NOTES
"Please see \"Imaging\" tab under Chart Review for full report.  This ultrasound was performed in the [facility], and may be located under Care Everywhere.    Camille Chang MD  Maternal Fetal Medicine      "

## 2021-06-12 NOTE — PROGRESS NOTES
"Please see \"Imaging\" tab under Chart Review for full report.  This ultrasound was performed in the [facility], and may be located under Care Everywhere.    Camille Chang MD  Maternal Fetal Medicine    "

## 2021-06-13 NOTE — NURSING NOTE
"NST performed due to FGR protocol.   reviewed efm tracing. See NST/BPP Doc Flowsheet tab.  Pt reporting \"strange\" fetal movements in the evenings to other clinic RN today.  Discussed with Dr. Peter who recommended pt contact her PCP when this happens to coordinate assessment.  Relayed this to pt who verbalized understanding.  Pt to return to clinic later this week as scheduled.  "

## 2021-06-13 NOTE — NURSING NOTE
28w4d here at [facility]. Placed on EFM at 1134, discontinued at 1205. NST performed due to FGR.   reviewed efm tracing. See NST/BPP Doc Flowsheet tab.

## 2021-06-13 NOTE — TELEPHONE ENCOUNTER
12/2/2020 8:50am & 12/2/2020 8:52am inside DM Consult  [facility] -   Referring: Khloe Cody  DX: GDM      12/03 - [facility] sent to [facility] to get patient in sooner

## 2021-06-13 NOTE — PROGRESS NOTES
"Please see \"Imaging\" tab under Chart Review for full report.  This ultrasound was performed in the [facility], and may be located under Care Everywhere.    Patti Hughes MD  Maternal Fetal Medicine    "

## 2021-06-13 NOTE — PROGRESS NOTES
NST performed due to fetal growth restriction.   reviewed efm tracing. See NST/BPP Doc Flowsheet tab.    Pt 26w1d gestation. Tracing appropriate for gestational age

## 2021-06-13 NOTE — PROGRESS NOTES
Outpatient/Triage Note:    Patient Name:  Piero Villarreal  :      1990  MRN:      [id number]    Assessment:   @ 28w6d   Decreased FM    Plan:     - Discharge to home undelivered. Reviewed warning signs including decreased fetal movement, leaking of fluid, vaginal bleeding, or signs of labor. Reviewed how to contact on-call CNM. Follow-up in clinic with CNM as scheduled or sooner as needed. All questions answered. Agrees with plan.     Subjective  Piero Villarreal is a 30 y.o.  who presented to [facility] for evaluation of decreased FM.   Denies leaking of fluid, bleeding, or changes in fetal movement. She states that she feeling occasional mild cramping.  This is a high risk pregnancy for polyhydramnios and is being followed by MFM.   She has appt with MFM scheduled for Monday.     Objective  Temp:  [98.6  F (37  C)] 98.6  F (37  C)  Resp:  [18] 18    Physical Exam:  General appearance:  comfortable  Psych: AAO x3  Skin: Pink, warm & dry  HEENT: unremarkable  Cardiovascular:  RRR, S1, S2, no extra sounds or murmurs  Respiratory:  breath sounds CTA bilaterally, anteriorly and posteriorly  Abdomen: soft, NT, gravid  Leopolds:          EFW:<4500 grams (AGA)     FHR:Baseline: 135 bpm, Variability: Moderate (6 - 25 bpm), Accelerations: 10x10 (<32 weeks gestation) and Decelerations: Absent    Uterine contractions:TocoFrequency: occasional   SVE:deferred    Total time spent with patient:15 minutes, >50% spent on counseling and coordination of care.    Provider:  ANDRIA Quigley,KASIA    Date:  2020  Time:  12:26 PM

## 2021-06-13 NOTE — PROGRESS NOTES
"Will discuss if needed with CNM if Utox is needed today, due to being  and smoking in pregnancy. Patient states, \"I quit smoking last week.\" Patient had previously been a smoker as well. 1143 Discussed patient with Sarika Carpenter CNM. Patient was flagged for UTOX based on history of smoking and pre-term contractions.  "

## 2021-06-13 NOTE — TELEPHONE ENCOUNTER
Patient had appointment with a [facility] Diabetes Educator today. Patient is from [facility], but could be seen sooner with a [facility] Diabetes Educator. Requested Rx's for diabetes supplies were sent to the pharmacy by me as [facility] Educator does not have access to [facility].    Maria R Allen RN, [facility]

## 2021-06-13 NOTE — NURSING NOTE
29w4d here at [facility]. Placed on EFM at 1033, discontinued at 1053. NST performed due to fetal growth restriction.   reviewed efm tracing. See NST/BPP Doc Flowsheet tab.

## 2021-06-13 NOTE — PROGRESS NOTES
"Please see \"Imaging\" tab under \"Chart Review\" for details of today's US.    Phyllis Lora, DO    "

## 2021-06-13 NOTE — PROGRESS NOTES
"Patient arrived to [facility] at 1104 ambulatory by herself as directed by on call provider for decreased fetal movement. She was moved into Triage room 31 placed on EFM/TOCO.  She states that \"last night I had a lot more movement than usual, and that they were more jerky movements.\" She also states, \"the increased movements woke me up at 1 and 4 am.\" Patient stated that she has had contractions most of her pregnancy, she states that they have felt stronger, not painful, just more tightness. Patient was diagnosed with GDM-diet controlled last week, she also stated, \"I was told I have too much amniotic fluid.\" Patient was seen by MFM and was diagnosed with IUGR and anterior placenta. Denies LOF and intercourse. Patient recently quit smoking last week, and is pre-term, she was flagged for needing a UTOX. Discussed with patient, patient will let writer know when she feels like she needs to void. Sarika Carpenter CNM is on her way to evaluate patient in person. Patient has had an occasional contraction since arriving, she denies pain with them.  "

## 2021-06-13 NOTE — NURSING NOTE
30w4d here at [facility]. Placed on EFM at 1405, discontinued at 1425. NST performed due to fetal growth restriction.   reviewed efm tracing. See NST/BPP Doc Flowsheet tab.

## 2021-06-13 NOTE — PROGRESS NOTES
NST performed due to fetal growth restriction.   reviewed efm tracing. See NST/BPP Doc Flowsheet tab.

## 2021-06-13 NOTE — PROGRESS NOTES
"Please see \"Imaging\" tab under Chart Review for full report.  This ultrasound was performed in the [facility], and may be located under Care Everywhere.    Camille Chang MD  Maternal Fetal Medicine    "

## 2021-06-13 NOTE — PROGRESS NOTES
"Please see \"Imaging\" tab under Chart Review for full report.  This ultrasound was performed in the [facility], and may be located under Care Everywhere.    Patti Hughes MD  Maternal Fetal Medicine    "

## 2021-06-13 NOTE — PROGRESS NOTES
"Please see \"Imaging\" tab under \"Chart Review\" for details of today's visit.    Pippa Peter        "

## 2021-06-13 NOTE — PROGRESS NOTES
"Please see \"Imaging\" tab under \"Chart Review\" for details of today's visit.    Pippa ePter        "

## 2021-06-13 NOTE — NURSING NOTE
31w1d here at [facility]. Placed on EFM at 0847, discontinued at 0907. NST performed due to fetal growth restriction.   reviewed efm tracing. See NST/BPP Doc Flowsheet tab.

## 2021-06-13 NOTE — PROGRESS NOTES
"Please see \"Imaging\" tab under Chart Review for full report.  This ultrasound was performed in the [facility], and may be located under Care Everywhere.    Camille Chang MD  Maternal Fetal Medicine    "

## 2021-06-14 NOTE — PROGRESS NOTES
"Please see \"Imaging\" tab under Chart Review for full report.  This ultrasound was performed in the [facility], and may be located under Care Everywhere.    Camille Chang MD  Maternal Fetal Medicine    "

## 2021-06-14 NOTE — PROGRESS NOTES
"S: Piero is a 29yo  at 36w4d who presents to [facility] for fetal monitoring.  Pt has been sent by [facility] after evaluation in their clinic.  Pts pregnancy has been complicated by IUGR and GDM.  Pt had a BPP at [facility] today which was 88.  Pt reports that she has an anterior placenta and has never been able to feel much fetal movement.  Pt states that she is very anxious about her baby's health.  Pt does have IOL scheduled for Monday.    O: /67 (Patient Position: Semi-rosenthal, Cuff Size: Adult Regular)   Pulse 85   Temp 98.8  F (37.1  C) (Oral)   Resp 16   Ht 4' 11\" (1.499 m)   Wt 163 lb (73.9 kg)   LMP 2020   BMI 32.92 kg/m    FHR cat I, reactive    Ctx pt denies  Cx exam deferred  A: IUP at 36w4d  P: Discussed with pt that we will monitor her today until she feel comfortable with fetal wellbeing and we discussed that all evaluations today support fetal wellbeing.    Will discharge to home when criteria of decreased patient anxiety and at least one hour of cat I tracing are achieved  Pt to return to the hospital on Monday for IOL    ANDRIA Dalton,KASIA    "

## 2021-06-14 NOTE — PROGRESS NOTES
"Please see \"Imaging\" tab under Chart Review for full report.  This ultrasound was performed in the [facility], and may be located under Care Everywhere.    Camille Chang MD  Maternal Fetal Medicine'  "

## 2021-06-14 NOTE — NURSING NOTE
34w1d here at [facility]. Placed on EFM at 0855, discontinued at 0917. NST performed due to fetal growth restriction.   reviewed efm tracing. See NST/BPP Doc Flowsheet tab.

## 2021-06-14 NOTE — PROGRESS NOTES
Patient presents to [facility] due to feeling leaking of fluid since yesterday afternoon. She doesn't remember the exact time. She denies any color or odor to the fluid and has been continuing to feel trickling of fluid. She denies any regular contractions, but has been having more contractions on and off over the last couple of weeks. She has been going to [facility] for fetal growth restriction as well as diet controlled gestational diabetes. ROM + and wet prep collected. ROM+ negative, patient requested SVE, 1.5/20%/-2, she reports being 1cm a week ago. Wet prep was positive for clue cells. Peg Lemos CNM updated via phone, plan to send RX to her pharmacy and okay for her to discharge to home.

## 2021-06-14 NOTE — PROGRESS NOTES
"Pt presented to [facility] for \"exstended NST\". Pt is a  36.4 weeks. Pregnancy complicated by GDM, elevated dopplers and growth restriction.  Admitted to room 32, monitors applied. Report to Patti Huffman RN.  "

## 2021-06-14 NOTE — PROGRESS NOTES
"Please see \"Imaging\" tab under Chart Review for full report.  This ultrasound was performed in the [facility], and may be located under Care Everywhere.    Camille Chang MD  Maternal Fetal Medicine    "

## 2021-06-14 NOTE — NURSING NOTE
Called [facility] labor [facility] delivery and spoke with Charge RN, Angela. Per Dr. Cruz, patient is to be seen in L&D for extended monitoring.

## 2021-06-14 NOTE — PROGRESS NOTES
"Please see \"Imaging\" tab under Chart Review for full report.  This ultrasound was performed in the [facility], and may be located under Care Everywhere.    Patti Hughes MD  Maternal Fetal Medicine    "

## 2021-06-14 NOTE — PROGRESS NOTES
"Please see the \"Imaging\" tab for details of today's ultrasound.    Kim Merino MD  Specialist in Maternal-Fetal Medicine    "

## 2021-06-14 NOTE — PROGRESS NOTES
"Please see \"Imaging\" tab under Chart Review for full report.  This ultrasound was performed in the [facility], and may be located under Care Everywhere.    Patti Hughes MD  Maternal Fetal Medicine    "

## 2021-06-14 NOTE — PROGRESS NOTES
"Please see \"Imaging\" tab under Chart Review for full report.  This ultrasound was performed in the [facility], and may be located under Care Everywhere.    Patti Hughes MD  Maternal Fetal Medicine    "

## 2021-06-14 NOTE — ANESTHESIA POSTPROCEDURE EVALUATION
Patient: Piero Villarreal  * No procedures listed *  Anesthesia type: epidural    Patient location: Labor and Delivery  Last vitals: No vitals data found for the desired time range.    Post vital signs: stable  Level of consciousness: awake and responds to simple questions  Post-anesthesia pain: pain controlled  Post-anesthesia nausea and vomiting: no  Pulmonary: unassisted, return to baseline  Cardiovascular: stable and blood pressure at baseline  Hydration: adequate  Anesthetic events: no    QCDR Measures:  ASA# 11 - Yasmine-op Cardiac Arrest: ASA11B - Patient did NOT experience unanticipated cardiac arrest  ASA# 12 - Yasmine-op Mortality Rate: ASA12B - Patient did NOT die  ASA# 13 - PACU Re-Intubation Rate: NA - No ETT / LMA used for case  ASA# 10 - Composite Anes Safety: ASA10A - No serious adverse event    Additional Notes:

## 2021-06-14 NOTE — PROGRESS NOTES
Flagyl given x1 orally. Patient's cervix rechecked, remains unchanged. Patient will be able to  antibiotic prescription in the morning and start taking.   Discharge instructions reviewed with patient and verbalized understanding of information.   Discharged to home in stable condition

## 2021-06-14 NOTE — ANESTHESIA PREPROCEDURE EVALUATION
Anesthesia Evaluation      Patient summary reviewed   No history of anesthetic complications     Airway   Mallampati: II  Neck ROM: full   Pulmonary - normal exam                          Cardiovascular - negative ROS and normal exam   Neuro/Psych - negative ROS     Endo/Other       GI/Hepatic/Renal - negative ROS           Dental                         Anesthesia Plan  Planned anesthetic: epidural    ASA 3     Anesthetic plan and risks discussed with: patient

## 2021-06-14 NOTE — NURSING NOTE
31w3d here at [facility]. Placed on EFM at 1315, discontinued at 1335. NST performed due to fetal growth restriction.   reviewed efm tracing. See NST/BPP Doc Flowsheet tab.

## 2021-06-14 NOTE — NURSING NOTE
36w4d here at [facility]. Placed on EFM at 0856, discontinued at 0924. Pt states decreased fetal movements today and requesting a BPP as well in addition to her limited ultrasound. Discussed with Dr. Cruz and BPP will be performed. NST performed due to fetal growth restriction. Dr. Cruz reviewed efm tracing. See NST/BPP Doc Flowsheet tab.

## 2021-06-14 NOTE — PROGRESS NOTES
Pt presents to [facility] C/O decreased fetal movement. Pt admitted to room 32.  32 weeks. Vss, afebrile monitors applied. Informed Dr. Chilel aware. Per Dr. Chilel pt may discharge home after a reactive NST.

## 2021-06-14 NOTE — PROGRESS NOTES
Please see Imaging tab in Epic for full ultrasound from [facility].    Tuyet Cruz MD  Maternal-Fetal Medicine

## 2021-06-14 NOTE — PROGRESS NOTES
CNM updated on pt's perception of decreased movement, current reactive tracing, contraction pattern and SVE. Pt to be instructed to return to hospital if any further concerns about fetal well-being and to follow up as scheduled.Discharge home.

## 2021-06-14 NOTE — PROGRESS NOTES
Patient is a  at 36w 0d gestation who presents to [facility] with complaints of contractions that began today and have gotten worse as the day progressed. Rates pain 8/10, warm showers and hydration have not helped with pain.  Patient was seen two days ago and wet prep + for clue cells. Patient states she has not picked up her antibiotic for this yet.  EFM category I. Audible fetal movement. Pt reports no LOF or vaginal bleeding.   SVE performed. .3.   Angelic Funes CNM called and updated on patient.  Orders received for one time dose of 500mg flagyl and oral hydration, keep patient for 1-2 hours to monitor contractions, will reassess cervix. Discharge to home if unchanged.

## 2021-06-14 NOTE — ANESTHESIA PROCEDURE NOTES
Epidural Block    Patient location during procedure: OB  Time Called: 2/1/2021 11:14 AM  Reason for Block:labor epidural  Staffing:  Performing  Anesthesiologist: Zachary Rodarte IV, MD  Preanesthetic Checklist  Completed: patient identified, risks, benefits, and alternatives discussed, timeout performed, consent obtained, at patient's request, airway assessed, oxygen available, suction available, emergency drugs available and hand hygiene performed  Procedure  Patient position: sitting  Prep: ChloraPrep  Patient monitoring: continuous pulse oximetry, heart rate and blood pressure  Approach: midline  Location: L4-L5  Injection technique: GILBERT saline    Needle  Needle type: Fabiola   Needle gauge: 18 G     Catheter in Space: 4  Assessment  Sensory level:  No complications

## 2021-06-14 NOTE — PROGRESS NOTES
NST reactive. Pt states feeling reassured and feels comfortable discharging to home. Will return on Monday 2/1/21  for induction

## 2021-06-14 NOTE — NURSING NOTE
33w1d here at [facility]. NST performed due to fetal growth restriction.   reviewed efm tracing. See NST/BPP Doc Flowsheet tab.

## 2021-07-03 NOTE — ADDENDUM NOTE
Addendum Note by Douglas Combs RN at 12/21/2020  9:00 AM     Author: Douglas Combs RN Service: -- Author Type: Registered Nurse    Filed: 12/21/2020 11:36 AM Encounter Date: 12/21/2020 Status: Signed    : Douglas Combs RN (Registered Nurse)    Addended by: DOUGLAS COMBS on: 12/21/2020 11:36 AM        Modules accepted: Orders

## 2021-09-19 ENCOUNTER — HEALTH MAINTENANCE LETTER (OUTPATIENT)
Age: 31
End: 2021-09-19

## 2021-10-13 ENCOUNTER — PATIENT OUTREACH (OUTPATIENT)
Dept: FAMILY MEDICINE | Facility: CLINIC | Age: 31
End: 2021-10-13

## 2022-01-19 DIAGNOSIS — J45.30 MILD PERSISTENT ASTHMA WITHOUT COMPLICATION: ICD-10-CM

## 2022-01-19 RX ORDER — ALBUTEROL SULFATE 0.83 MG/ML
2.5 SOLUTION RESPIRATORY (INHALATION) EVERY 6 HOURS PRN
Qty: 3 ML | Refills: 3 | Status: SHIPPED | OUTPATIENT
Start: 2022-01-19 | End: 2022-11-04

## 2022-01-19 RX ORDER — FLUTICASONE PROPIONATE 110 UG/1
2 AEROSOL, METERED RESPIRATORY (INHALATION) 2 TIMES DAILY
Qty: 12 G | Refills: 1 | Status: SHIPPED | OUTPATIENT
Start: 2022-01-19 | End: 2022-04-29

## 2022-01-25 DIAGNOSIS — J45.30 MILD PERSISTENT ASTHMA WITHOUT COMPLICATION: ICD-10-CM

## 2022-01-26 RX ORDER — ALBUTEROL SULFATE 90 UG/1
2 AEROSOL, METERED RESPIRATORY (INHALATION) EVERY 4 HOURS PRN
Qty: 18 G | Refills: 4 | Status: SHIPPED | OUTPATIENT
Start: 2022-01-26 | End: 2022-08-16

## 2022-03-06 ENCOUNTER — HEALTH MAINTENANCE LETTER (OUTPATIENT)
Age: 32
End: 2022-03-06

## 2022-04-29 DIAGNOSIS — J45.30 MILD PERSISTENT ASTHMA WITHOUT COMPLICATION: ICD-10-CM

## 2022-04-29 RX ORDER — FLUTICASONE PROPIONATE 110 UG/1
2 AEROSOL, METERED RESPIRATORY (INHALATION) 2 TIMES DAILY
Qty: 12 G | Refills: 1 | Status: SHIPPED | OUTPATIENT
Start: 2022-04-29 | End: 2022-10-20

## 2022-08-16 DIAGNOSIS — J45.30 MILD PERSISTENT ASTHMA WITHOUT COMPLICATION: ICD-10-CM

## 2022-08-16 RX ORDER — ALBUTEROL SULFATE 90 UG/1
2 AEROSOL, METERED RESPIRATORY (INHALATION) EVERY 4 HOURS PRN
Qty: 18 G | Refills: 1 | Status: SHIPPED | OUTPATIENT
Start: 2022-08-16 | End: 2022-11-04

## 2022-10-20 ENCOUNTER — OFFICE VISIT (OUTPATIENT)
Dept: FAMILY MEDICINE | Facility: CLINIC | Age: 32
End: 2022-10-20
Payer: COMMERCIAL

## 2022-10-20 VITALS
DIASTOLIC BLOOD PRESSURE: 79 MMHG | HEIGHT: 59 IN | BODY MASS INDEX: 29.84 KG/M2 | WEIGHT: 148 LBS | RESPIRATION RATE: 20 BRPM | TEMPERATURE: 100.1 F | HEART RATE: 76 BPM | OXYGEN SATURATION: 100 % | SYSTOLIC BLOOD PRESSURE: 121 MMHG

## 2022-10-20 DIAGNOSIS — F41.0 PANIC ATTACK: Primary | ICD-10-CM

## 2022-10-20 DIAGNOSIS — J45.30 MILD PERSISTENT ASTHMA WITHOUT COMPLICATION: ICD-10-CM

## 2022-10-20 PROCEDURE — 99214 OFFICE O/P EST MOD 30 MIN: CPT | Mod: GC | Performed by: STUDENT IN AN ORGANIZED HEALTH CARE EDUCATION/TRAINING PROGRAM

## 2022-10-20 RX ORDER — FLUTICASONE PROPIONATE 220 UG/1
1 AEROSOL, METERED RESPIRATORY (INHALATION) 2 TIMES DAILY
Qty: 12 G | Refills: 3 | Status: SHIPPED | OUTPATIENT
Start: 2022-10-20 | End: 2023-12-26

## 2022-10-20 RX ORDER — HYDROXYZINE HYDROCHLORIDE 25 MG/1
25 TABLET, FILM COATED ORAL 3 TIMES DAILY PRN
Qty: 90 TABLET | Refills: 1 | Status: SHIPPED | OUTPATIENT
Start: 2022-10-20 | End: 2022-10-20

## 2022-10-20 RX ORDER — HYDROXYZINE HYDROCHLORIDE 25 MG/1
25 TABLET, FILM COATED ORAL 3 TIMES DAILY PRN
Qty: 90 TABLET | Refills: 1 | Status: SHIPPED | OUTPATIENT
Start: 2022-10-20 | End: 2023-06-13

## 2022-10-20 ASSESSMENT — ANXIETY QUESTIONNAIRES
GAD7 TOTAL SCORE: 19
IF YOU CHECKED OFF ANY PROBLEMS ON THIS QUESTIONNAIRE, HOW DIFFICULT HAVE THESE PROBLEMS MADE IT FOR YOU TO DO YOUR WORK, TAKE CARE OF THINGS AT HOME, OR GET ALONG WITH OTHER PEOPLE: VERY DIFFICULT
7. FEELING AFRAID AS IF SOMETHING AWFUL MIGHT HAPPEN: NEARLY EVERY DAY
1. FEELING NERVOUS, ANXIOUS, OR ON EDGE: NEARLY EVERY DAY
GAD7 TOTAL SCORE: 19
6. BECOMING EASILY ANNOYED OR IRRITABLE: SEVERAL DAYS
2. NOT BEING ABLE TO STOP OR CONTROL WORRYING: NEARLY EVERY DAY
5. BEING SO RESTLESS THAT IT IS HARD TO SIT STILL: NEARLY EVERY DAY
3. WORRYING TOO MUCH ABOUT DIFFERENT THINGS: NEARLY EVERY DAY

## 2022-10-20 ASSESSMENT — PATIENT HEALTH QUESTIONNAIRE - PHQ9
SUM OF ALL RESPONSES TO PHQ QUESTIONS 1-9: 6
5. POOR APPETITE OR OVEREATING: NEARLY EVERY DAY

## 2022-10-20 ASSESSMENT — ASTHMA QUESTIONNAIRES: ACT_TOTALSCORE: 9

## 2022-10-20 NOTE — PROGRESS NOTES
Assessment & Plan     Panic attack  BEATRIZ score of 17 today. Symptoms are consistent with panic attacks. Patient advised of therapy and medication options.  Plan to start with 50 mg of Zoloft, may need to increase to 100 daily in the next 4 to 6 weeks.  Atarax prescribed for as needed medication during panic attacks.  FMLA forms completed at this time.  Requested be for leave if indicated due to panic attacks, or extended breaks as needed during the day.  We will continue to discuss psychotherapy as an option.  Follow-up in 2 weeks.  - sertraline (ZOLOFT) 50 MG tablet; Take 1 tablet (50 mg) by mouth daily  - hydrOXYzine (ATARAX) 25 MG tablet; Take 1 tablet (25 mg) by mouth 3 times daily as needed for anxiety    Mild persistent asthma without complication  ACT score of 9 today Refilled medications and patient advised of how to take inhalers properly.  We will reevaluate ACT in the next few weeks, may need to adjust controller inhaler.  - fluticasone (FLOVENT HFA) 220 MCG/ACT inhaler; Inhale 1 puff into the lungs 2 times daily    Precepted with Dr. Walker.    Eduarda Moncada, MS3  Medical Student, [facility]    I was present with the medical student who participated in the service and in the documentation of this note. I have verified the history and personally performed the physical exam and medical decision making, and have verified the content of the note, which accurately reflects my assessment of the patient and the plan of care.     Karolina Cody MD  [facility] [place] Subjective Bao is a 32 year old female, presenting for a refill of her asthma medications and to have FMLA forms signed for anxiety.    HPI     Patient describes having recurrent panic attacks since 2019 that have worsened since this past February. These episodes occur without a known trigger and cause her to have racing thoughts, shortness of breath, and hot flashes. Outside of these episodes, she is having  "anxiety about panic attacks. These panic attacks have been debilitating and have affected her ability to work, leading to increased stress at work.    She reports having done therapy in the past which helped and now wants to take medications. Therapy was helpful for previous anxiety, not interested currently  States father may have issues with anxiety/depression, he passed several years ago.  She is unsure if he was ever on medication for this  She has never been treated with medication for her mood.    BEATRIZ-7 is elevated at 17 today  PHQ-9 is elevated at 6  She denies any thoughts of self-harm or suicidal ideation, has never attempted self-harm or suicide in the past.    Feels safe at home and has help from her .    At times lately she has had to step away from work for extended breaks or take the next day off from work because she will have panic attacks.  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  Request [employer] forms for accommodations at work.    With her asthma, she reports having more exacerbations lately due to her running out of her fluticasone. Taking albuterol as needed but also running low.  Has not had fluticasone for several weeks.  States she did wake up with a cough, no other symptoms.  She does have a mild elevated temp of 100.1, not a true fever.      Review of Systems   Constitutional, HEENT, cardiovascular, pulmonary, GI, , musculoskeletal, neuro, skin, endocrine and psych systems are negative, except as otherwise noted.      Objective    /79   Pulse 76   Temp 100.1  F (37.8  C)   Resp 20   Ht 1.499 m (4' 11\")   Wt 67.1 kg (148 lb)   SpO2 100%   BMI 29.89 kg/m    Body mass index is 29.89 kg/m .  Physical Exam   GENERAL: healthy, alert and no distress  RESP: lungs clear to auscultation - no rales, rhonchi or wheezes  CV: regular rate and rhythm, normal S1 S2, no S3 or S4, no murmur, click or rub, no peripheral edema and peripheral pulses strong  PSYCH: anxious appearing, " teary-eyed, mentation appears normal

## 2022-10-23 NOTE — PROGRESS NOTES
Preceptor Attestation:   Patient seen, evaluated and discussed with the resident. I have verified the content of the note, which accurately reflects my assessment of the patient and the plan of care.    Supervising Physician:Cynthia Walker DO [facility]

## 2022-11-04 DIAGNOSIS — J45.30 MILD PERSISTENT ASTHMA WITHOUT COMPLICATION: ICD-10-CM

## 2022-11-04 RX ORDER — ALBUTEROL SULFATE 0.83 MG/ML
2.5 SOLUTION RESPIRATORY (INHALATION) EVERY 6 HOURS PRN
Qty: 3 ML | Refills: 1 | Status: SHIPPED | OUTPATIENT
Start: 2022-11-04 | End: 2023-12-26

## 2022-11-04 RX ORDER — ALBUTEROL SULFATE 90 UG/1
2 AEROSOL, METERED RESPIRATORY (INHALATION) EVERY 4 HOURS PRN
Qty: 18 G | Refills: 1 | Status: SHIPPED | OUTPATIENT
Start: 2022-11-04 | End: 2022-12-05

## 2022-11-21 ENCOUNTER — HEALTH MAINTENANCE LETTER (OUTPATIENT)
Age: 32
End: 2022-11-21

## 2022-12-05 DIAGNOSIS — J45.30 MILD PERSISTENT ASTHMA WITHOUT COMPLICATION: ICD-10-CM

## 2022-12-06 RX ORDER — ALBUTEROL SULFATE 90 UG/1
2 AEROSOL, METERED RESPIRATORY (INHALATION) EVERY 4 HOURS PRN
Qty: 18 G | Refills: 1 | Status: SHIPPED | OUTPATIENT
Start: 2022-12-06 | End: 2023-01-10

## 2023-01-10 DIAGNOSIS — J45.30 MILD PERSISTENT ASTHMA WITHOUT COMPLICATION: ICD-10-CM

## 2023-01-10 RX ORDER — ALBUTEROL SULFATE 90 UG/1
2 AEROSOL, METERED RESPIRATORY (INHALATION) EVERY 4 HOURS PRN
Qty: 18 G | Refills: 1 | Status: SHIPPED | OUTPATIENT
Start: 2023-01-10 | End: 2023-02-06

## 2023-02-06 DIAGNOSIS — J45.30 MILD PERSISTENT ASTHMA WITHOUT COMPLICATION: ICD-10-CM

## 2023-02-07 RX ORDER — ALBUTEROL SULFATE 90 UG/1
2 AEROSOL, METERED RESPIRATORY (INHALATION) EVERY 4 HOURS PRN
Qty: 18 G | Refills: 1 | Status: SHIPPED | OUTPATIENT
Start: 2023-02-07 | End: 2023-03-08

## 2023-03-08 DIAGNOSIS — J45.30 MILD PERSISTENT ASTHMA WITHOUT COMPLICATION: ICD-10-CM

## 2023-03-08 RX ORDER — ALBUTEROL SULFATE 90 UG/1
2 AEROSOL, METERED RESPIRATORY (INHALATION) EVERY 4 HOURS PRN
Qty: 18 G | Refills: 1 | Status: SHIPPED | OUTPATIENT
Start: 2023-03-08 | End: 2023-04-11

## 2023-04-04 ENCOUNTER — HOSPITAL ENCOUNTER (EMERGENCY)
Facility: HOSPITAL | Age: 33
Discharge: HOME | End: 2023-04-04
Attending: EMERGENCY MEDICINE | Admitting: EMERGENCY MEDICINE
Payer: COMMERCIAL

## 2023-04-04 VITALS
OXYGEN SATURATION: 100 % | SYSTOLIC BLOOD PRESSURE: 149 MMHG | BODY MASS INDEX: 26.11 KG/M2 | TEMPERATURE: 97.8 F | HEIGHT: 60 IN | WEIGHT: 133 LBS | DIASTOLIC BLOOD PRESSURE: 92 MMHG | RESPIRATION RATE: 18 BRPM | HEART RATE: 74 BPM

## 2023-04-04 DIAGNOSIS — R07.89 ATYPICAL CHEST PAIN: ICD-10-CM

## 2023-04-04 LAB
ANION GAP SERPL CALCULATED.3IONS-SCNC: 10 MMOL/L (ref 7–15)
BUN SERPL-MCNC: 13.8 MG/DL (ref 6–20)
CALCIUM SERPL-MCNC: 9.8 MG/DL (ref 8.6–10)
CHLORIDE SERPL-SCNC: 106 MMOL/L (ref 98–107)
CREAT SERPL-MCNC: 0.7 MG/DL (ref 0.51–0.95)
DEPRECATED HCO3 PLAS-SCNC: 26 MMOL/L (ref 22–29)
ERYTHROCYTE [DISTWIDTH] IN BLOOD BY AUTOMATED COUNT: 12.9 % (ref 10–15)
GFR SERPL CREATININE-BSD FRML MDRD: >90 ML/MIN/1.73M2
GLUCOSE SERPL-MCNC: 128 MG/DL (ref 70–99)
HCT VFR BLD AUTO: 42.3 % (ref 35–47)
HGB BLD-MCNC: 14.1 G/DL (ref 11.7–15.7)
MCH RBC QN AUTO: 31 PG (ref 26.5–33)
MCHC RBC AUTO-ENTMCNC: 33.3 G/DL (ref 31.5–36.5)
MCV RBC AUTO: 93 FL (ref 78–100)
PLATELET # BLD AUTO: 211 10E3/UL (ref 150–450)
POTASSIUM SERPL-SCNC: 4.7 MMOL/L (ref 3.4–5.3)
RBC # BLD AUTO: 4.55 10E6/UL (ref 3.8–5.2)
SODIUM SERPL-SCNC: 142 MMOL/L (ref 136–145)
TROPONIN T SERPL HS-MCNC: <6 NG/L
TSH SERPL DL<=0.005 MIU/L-ACNC: 3.03 UIU/ML (ref 0.3–4.2)
WBC # BLD AUTO: 10.3 10E3/UL (ref 4–11)

## 2023-04-04 PROCEDURE — 36415 COLL VENOUS BLD VENIPUNCTURE: CPT | Performed by: EMERGENCY MEDICINE

## 2023-04-04 PROCEDURE — 84443 ASSAY THYROID STIM HORMONE: CPT | Performed by: EMERGENCY MEDICINE

## 2023-04-04 PROCEDURE — 85027 COMPLETE CBC AUTOMATED: CPT | Performed by: EMERGENCY MEDICINE

## 2023-04-04 PROCEDURE — 99284 EMERGENCY DEPT VISIT MOD MDM: CPT

## 2023-04-04 PROCEDURE — 80048 BASIC METABOLIC PNL TOTAL CA: CPT | Performed by: EMERGENCY MEDICINE

## 2023-04-04 PROCEDURE — 93005 ELECTROCARDIOGRAM TRACING: CPT | Performed by: EMERGENCY MEDICINE

## 2023-04-04 PROCEDURE — 84484 ASSAY OF TROPONIN QUANT: CPT | Performed by: EMERGENCY MEDICINE

## 2023-04-04 NOTE — ED PROVIDER NOTES
EMERGENCY DEPARTMENT ENCOUNTER      NAME: Piero Villarreal  AGE: 32 year old female  YOB: 1990  MRN: [id number]  EVALUATION DATE & TIME: No admission date for patient encounter.    PCP: Bhumi Sotomayor    ED PROVIDER: Bigg Cummins M.D.      Chief Complaint   Patient presents with     Dizziness         FINAL IMPRESSION:  Atypical chest pain      ED COURSE & MEDICAL DECISION MAKING:    Pertinent Labs & Imaging studies reviewed. (See chart for details)  32 year old female presents to the Emergency Department for evaluation of dizziness and slight chest pressure.  Symptoms intermittent for the last 3 days.  Seen in emergency room.  EKG subtle ST segment changes referred here for further evaluation.  Patient without significant cardiac risk factors.  She smokes rarely.  No diabetes, hypertension, dyslipidemia, early family history.  Exam is benign.  EKG was obtained on arrival.  This reveals nonspecific ST segment flattening but nothing more remarkable.  Will obtain troponin and baseline blood work out of caution.  However risk of cardiac etiology extremely remote.  Patient appears non toxic with stable vitals signs. Overall exam is benign.    5:53 PM I introduced myself to the patient, obtained patient history, performed a physical exam, and discussed plan for ED workup including potential diagnostic laboratory/imaging studies and interventions.   7:16 PM.  Troponin less than 6.  TSH normal at 3.03.  Electrolytes essentially normal other than minimal hyperglycemia at 128.  Awaiting CBC  7:34 PM.  CBC is normal.  Patient with atypical chest pain with absence of cardiac risk factors.  Minimal EKG changes unremarkable.  Patient reassured in regards to findings.  Recommendation follow-up primary care physician.  TSH also obtained today given history of anxiety.  TSH is normal.      Medical Decision Making    History:    Supplemental history from: Documented in chart, if applicable    External Record(s) reviewed:  "Outpatient Record: Urgent Care Visit on 4/4/23 (Details documented in chart).    Work Up:    Chart documentation includes differential considered and any EKGs or imaging interpreted by provider.    In additional to work up documented, I considered the following work up: Documented in chart, if applicable.    External consultation:    Discussion of management with another provider: Documented in chart, if applicable and See chart documentation, if applicable    Discussed with radiology regarding test interpretation: N/A    Complicating factors:    Care impacted by chronic illness: Mental Health    Care affected by social determinants of health: N/A    Disposition considerations: Discharge. No recommendations on prescription strength medication(s). See documentation for any additional details.        At the conclusion of the encounter I discussed the results of all of the tests and the disposition. The questions were answered and return precautions provided. The patient or family acknowledged understanding and was agreeable with the care plan.         MEDICATIONS GIVEN IN THE EMERGENCY:  Medications - No data to display    NEW PRESCRIPTIONS STARTED AT TODAY'S ER VISIT  New Prescriptions    No medications on file            =================================================================    HPI    Patient information was obtained from: patient    Use of : N/A        Piero Villarreal is a 32 year old female with a pertinent past medical history of anxiety, asthma, and tobacco use who presents by walk in for evaluation of chest pain.    Per chart review, patient was seen at Urgent Care on 4/4/23. The nurse there spoke with an  provider who recommended they be seen at the ED. Patient initially complained of intermittent 4/10 \"dull\" chest pain which began 3 days ago. It has become more frequent. Pain is localized to the medial sternum. She also complained of being lightheaded and feeling SOB when her heart is " beating too fast.     Patient presents today with a dully, achy chest pain which started three days ago. The pain has been intermittent and is localized to her medial sternum. Patient states she knows she has anxiety and has a history of panic attack; however, over the last few days, her pain has been consistent and has worsened. She denies any palliating or provoking factors.    Patient also endorses some shortness of breath, a fast heart rate, and some lightheadedness.    She denies any cardiac history, HLD, or HTN.    Patient reports smoking 1 cigarette every 2-3 days.    REVIEW OF SYSTEMS   Constitutional:  Denies fever, chills  Respiratory:  Denies productive cough or increased work of breathing. Positive for shortness of breath.  Cardiovascular:  Denies palpitations. Positive for chest pain and increased heart rate.  GI:  Denies abdominal pain, nausea, vomiting, or change in bowel or bladder habits   Musculoskeletal:  Denies any new muscle/joint swelling  Skin:  Denies rash   Neurologic:  Denies focal weakness. Positive for lightheadedness.  All systems negative except as marked.     PAST MEDICAL HISTORY:  Past Medical History:   Diagnosis Date     Asthma     uses inhaler prn     Bacterial vaginosis 2013     Diabetes mellitus (H)     gestational diabetes, diet controlled diet controlled     Liver mass, right lobe     6 mm, seen on US on , likely hemangioma     Monochorionic diamniotic twin gestation     cardiac abnormalities in Twin A, chest mass in Twin B,  at 21 weeks     Pelvic pain      Uncomplicated asthma     pt only gets flare ups when ill       PAST SURGICAL HISTORY:  Past Surgical History:   Procedure Laterality Date     DILATION AND CURETTAGE       NO HISTORY OF SURGERY           CURRENT MEDICATIONS:    Prior to Admission medications    Medication Sig Start Date End Date Taking? Authorizing Provider   albuterol (PROAIR HFA/PROVENTIL HFA/VENTOLIN HFA) 108 (90 Base) MCG/ACT  inhaler Inhale 2 puffs into the lungs every 4 hours as needed for shortness of breath or wheezing 3/8/23   Bhumi Sotomayor MD   albuterol (PROVENTIL) (2.5 MG/3ML) 0.083% neb solution Take 1 vial (2.5 mg) by nebulization every 6 hours as needed for shortness of breath / dyspnea or wheezing 11/4/22   Bhumi Sotomayor MD   fluticasone (FLOVENT HFA) 220 MCG/ACT inhaler Inhale 1 puff into the lungs 2 times daily 10/20/22   Karolina Cody MD   hydrOXYzine (ATARAX) 25 MG tablet Take 1 tablet (25 mg) by mouth 3 times daily as needed for anxiety 10/20/22   Karolina Cody MD   sertraline (ZOLOFT) 50 MG tablet Take 1 tablet (50 mg) by mouth daily 10/20/22   Karolina Cody MD        ALLERGIES:  Allergies   Allergen Reactions     Cats        FAMILY HISTORY:  Family History   Problem Relation Age of Onset     Asthma Son      Diabetes No family hx of      Coronary Artery Disease No family hx of      Hypertension No family hx of      Breast Cancer No family hx of      Cancer - colorectal No family hx of      Ovarian Cancer No family hx of      Prostate Cancer No family hx of      Other Cancer No family hx of      Mental Illness No family hx of      Depression/Anxiety No family hx of      Cerebrovascular Disease No family hx of      Thyroid Disease No family hx of      Obesity No family hx of      Thyroid Disease No family hx of      Genetic Disorder No family hx of      Osteoporosis No family hx of      Anesthesia Reaction No family hx of      Substance Abuse No family hx of      Mental Illness No family hx of      Anxiety Disorder No family hx of      Depression No family hx of      Colon Cancer No family hx of      Hyperlipidemia No family hx of        SOCIAL HISTORY:   Social History     Socioeconomic History     Marital status: Single     Spouse name: None     Number of children: None     Years of education: None     Highest education level: None   Tobacco Use     Smoking status: Former     Packs/day: 0.50      Types: Cigarettes     Smokeless tobacco: Never   Substance and Sexual Activity     Alcohol use: Not Currently     Alcohol/week: 0.0 standard drinks of alcohol     Comment: ocassionally     Drug use: No     Sexual activity: Yes     Partners: Male     Birth control/protection: None   Social History Narrative    Lives with her partner and two children. Not currently working. Does smoke 1/2 ppd of cigarettes. Occasional alcohol use 2-4 times per month. No marijuana or illicit drug use.        VITALS:  Patient Vitals for the past 24 hrs:   BP Temp Temp src Pulse Resp SpO2 Height Weight   04/04/23 1749 (!) 149/92 97.8  F (36.6  C) Oral 74 18 100 % 1.524 m (5') 60.3 kg (133 lb)        PHYSICAL EXAM      Constitutional:  Awake, alert, in no apparent distress  HENT:  Normocephalic, Atraumatic. Bilateral external ears normal. Oropharynx moist. Nose normal. Neck- Normal range of motion with no guarding, No midline cervical tenderness, Supple, No stridor.   Eyes:  PERRL, EOMI with no signs of entrapment, Conjunctiva normal, No discharge.   Respiratory:  Normal breath sounds, No respiratory distress, No wheezing.    Cardiovascular:  Normal heart rate, Normal rhythm, No appreciable rubs or gallops.   GI:  Soft, No tenderness, No distension, No palpable masses  Musculoskeletal:  Intact distal pulses, No edema. Good range of motion in all major joints. No tenderness to palpation or major deformities noted.  Integument:  Warm, Dry, No erythema, No rash.   Neurologic:  Alert & oriented, Normal motor function, Normal sensory function, No focal deficits noted.   Psychiatric:  Affect normal, Judgment normal, Mood normal.     LAB:  All pertinent labs reviewed and interpreted.     Results for orders placed or performed during the hospital encounter of 04/04/23   Basic metabolic panel     Status: Abnormal   Result Value Ref Range    Sodium 142 136 - 145 mmol/L    Potassium 4.7 3.4 - 5.3 mmol/L    Chloride 106 98 - 107 mmol/L    Carbon  Dioxide (CO2) 26 22 - 29 mmol/L    Anion Gap 10 7 - 15 mmol/L    Urea Nitrogen 13.8 6.0 - 20.0 mg/dL    Creatinine 0.70 0.51 - 0.95 mg/dL    Calcium 9.8 8.6 - 10.0 mg/dL    Glucose 128 (H) 70 - 99 mg/dL    GFR Estimate >90 >60 mL/min/1.73m2   CBC (+ platelets, no diff)     Status: Normal   Result Value Ref Range    WBC Count 10.3 4.0 - 11.0 10e3/uL    RBC Count 4.55 3.80 - 5.20 10e6/uL    Hemoglobin 14.1 11.7 - 15.7 g/dL    Hematocrit 42.3 35.0 - 47.0 %    MCV 93 78 - 100 fL    MCH 31.0 26.5 - 33.0 pg    MCHC 33.3 31.5 - 36.5 g/dL    RDW 12.9 10.0 - 15.0 %    Platelet Count 211 150 - 450 10e3/uL   Troponin T, High Sensitivity (now)     Status: Normal   Result Value Ref Range    Troponin T, High Sensitivity <6 <=14 ng/L   TSH with free T4 reflex     Status: Normal   Result Value Ref Range    TSH 3.03 0.30 - 4.20 uIU/mL            EKG:    Normal sinus rhythm with sinus arrhythmia.  Rate of 71.  Normal QRS.  Nonspecific ST segment flattening laterally.  ST segment flattening is new compared to July 1, 2016   I have independently reviewed and interpreted the EKG(s) documented above.       I, Ofelia Huffman, am serving as a scribe to document services personally performed by Dr. Bigg Cummins MD, based on my observation and the provider's statements to me. I, Dr. Bigg Cummins MD attest that Ofelia Huffman is acting in a scribe capacity, has observed my performance of the services and has documented them in accordance with my direction.    Bigg Cummins M.D.  Emergency Medicine  [facility] EMERGENCY DEPARTMENT  [address] [place] [id number] [phone]  Dept: [phone]     Bigg Cummins MD  04/04/23 4740

## 2023-04-04 NOTE — ED TRIAGE NOTES
Chest pain on and off for last 3 days. Comes and goes. Has been increasing in number of times today. Seen at  and told to come to the ED. aslo has nausea, light headedness and a few episodes of blurred vision. Denies n/t. Some sob. Denies pain in legs. Some pain in left shoulder.     Triage Assessment     Row Name 04/04/23 6752       Triage Assessment (Adult)    Airway WDL WDL

## 2023-04-05 ENCOUNTER — PATIENT OUTREACH (OUTPATIENT)
Dept: CARE COORDINATION | Facility: CLINIC | Age: 33
End: 2023-04-05
Payer: COMMERCIAL

## 2023-04-05 LAB
ATRIAL RATE - MUSE: 71 BPM
DIASTOLIC BLOOD PRESSURE - MUSE: NORMAL MMHG
INTERPRETATION ECG - MUSE: NORMAL
P AXIS - MUSE: 70 DEGREES
PR INTERVAL - MUSE: 132 MS
QRS DURATION - MUSE: 96 MS
QT - MUSE: 390 MS
QTC - MUSE: 423 MS
R AXIS - MUSE: 60 DEGREES
SYSTOLIC BLOOD PRESSURE - MUSE: NORMAL MMHG
T AXIS - MUSE: 42 DEGREES
VENTRICULAR RATE- MUSE: 71 BPM

## 2023-04-05 NOTE — PROGRESS NOTES
Clinic Care Coordination Contact    Follow Up Progress Note      Assessment: The pt was recently in the ED, I called to check up on the pt and help the pt setup a ED follow up. The pt was at [facility] for dizziness. I called the pt, but got her vm, so I left a vm for the pt to give me a call back.     Care Gaps:    Health Maintenance Due   Topic Date Due     YEARLY PREVENTIVE VISIT  Never done     ADVANCE CARE PLANNING  Never done     Pneumococcal Vaccine: Pediatrics (0 to 5 Years) and At-Risk Patients (6 to 64 Years) (1 - PCV) Never done     DTAP/TDAP/TD IMMUNIZATION (5 - Tdap) 04/26/2001     HIV SCREENING  Never done     HEPATITIS C SCREENING  Never done     PAP  02/23/2018     ASTHMA ACTION PLAN  01/16/2020     COVID-19 Vaccine (4 - Booster for Pfizer series) 03/12/2022     INFLUENZA VACCINE (1) Never done     PHQ-2 (once per calendar year)  01/01/2023     ASTHMA CONTROL TEST  04/20/2023           Care Plans      Intervention/Education provided during outreach:               Plan:     Care Coordinator will follow up in

## 2023-04-05 NOTE — PROGRESS NOTES
I got a call back from the pt, pt called to say that she is doing fine now. Pt stated that she did not feel that she needs a follow up.

## 2023-04-10 ENCOUNTER — OFFICE VISIT (OUTPATIENT)
Dept: FAMILY MEDICINE | Facility: CLINIC | Age: 33
End: 2023-04-10
Payer: COMMERCIAL

## 2023-04-10 VITALS
RESPIRATION RATE: 20 BRPM | BODY MASS INDEX: 26.17 KG/M2 | OXYGEN SATURATION: 99 % | HEART RATE: 85 BPM | SYSTOLIC BLOOD PRESSURE: 126 MMHG | WEIGHT: 134 LBS | DIASTOLIC BLOOD PRESSURE: 82 MMHG

## 2023-04-10 DIAGNOSIS — J45.30 MILD PERSISTENT ASTHMA WITHOUT COMPLICATION: ICD-10-CM

## 2023-04-10 DIAGNOSIS — F32.1 CURRENT MODERATE EPISODE OF MAJOR DEPRESSIVE DISORDER, UNSPECIFIED WHETHER RECURRENT (H): Primary | ICD-10-CM

## 2023-04-10 DIAGNOSIS — F41.0 ANXIETY ATTACK: ICD-10-CM

## 2023-04-10 PROCEDURE — 99214 OFFICE O/P EST MOD 30 MIN: CPT | Mod: GC | Performed by: STUDENT IN AN ORGANIZED HEALTH CARE EDUCATION/TRAINING PROGRAM

## 2023-04-10 ASSESSMENT — ANXIETY QUESTIONNAIRES
3. WORRYING TOO MUCH ABOUT DIFFERENT THINGS: NEARLY EVERY DAY
5. BEING SO RESTLESS THAT IT IS HARD TO SIT STILL: NEARLY EVERY DAY
6. BECOMING EASILY ANNOYED OR IRRITABLE: NEARLY EVERY DAY
7. FEELING AFRAID AS IF SOMETHING AWFUL MIGHT HAPPEN: NOT AT ALL
GAD7 TOTAL SCORE: 18
2. NOT BEING ABLE TO STOP OR CONTROL WORRYING: NEARLY EVERY DAY
GAD7 TOTAL SCORE: 18
IF YOU CHECKED OFF ANY PROBLEMS ON THIS QUESTIONNAIRE, HOW DIFFICULT HAVE THESE PROBLEMS MADE IT FOR YOU TO DO YOUR WORK, TAKE CARE OF THINGS AT HOME, OR GET ALONG WITH OTHER PEOPLE: VERY DIFFICULT
1. FEELING NERVOUS, ANXIOUS, OR ON EDGE: NEARLY EVERY DAY

## 2023-04-10 ASSESSMENT — PATIENT HEALTH QUESTIONNAIRE - PHQ9
SUM OF ALL RESPONSES TO PHQ QUESTIONS 1-9: 16
5. POOR APPETITE OR OVEREATING: NEARLY EVERY DAY

## 2023-04-10 NOTE — TELEPHONE ENCOUNTER
Message to physician:     Date of last visit: 10/20/2022    Date of next visit if scheduled:     Potassium   Date Value Ref Range Status   04/04/2023 4.7 3.4 - 5.3 mmol/L Final     Creatinine   Date Value Ref Range Status   04/04/2023 0.70 0.51 - 0.95 mg/dL Final     GFR Estimate   Date Value Ref Range Status   04/04/2023 >90 >60 mL/min/1.73m2 Final     Comment:     eGFR calculated using 2021 CKD-EPI equation.   11/17/2016 >60 >60 mL/min/1.73m2 Final       BP Readings from Last 3 Encounters:   04/04/23 (!) 149/92   10/20/22 121/79   05/11/21 111/76       No results found for: A1C    Please complete refill and CLOSE ENCOUNTER.  Closing the encounter signifies the refill is complete.

## 2023-04-10 NOTE — PROGRESS NOTES
Assessment & Plan     Current moderate episode of major depressive disorder, unspecified whether recurrent (H)  Anxiety attack  Current depression, uncontrolled. Does not feel like Zoloft is working for her and discontinued it on her own many weeks ago due to headaches. Will trial Lexapro at 10 mg, increase as needed. She has also not taken Atarax for fear of similar side effects however continues to have anxiety attacks and chest pressure as a manifestation of that. Recommended she start taking hydroxyzine as needed. Also offered MH therapy however patient declined at this time and states she wants to try medications first. Reviewed ED notes, reassuring that these symptoms are not cardiac in nature.  - escitalopram (LEXAPRO) 10 MG tablet; Take 1 tablet (10 mg) by mouth daily for 30 days    Depression Screening Follow Up        4/10/2023     3:22 PM   PHQ   PHQ-9 Total Score 16   Q9: Thoughts of better off dead/self-harm past 2 weeks Not at all         4/10/2023     3:22 PM   Last PHQ-9   1.  Little interest or pleasure in doing things 2   2.  Feeling down, depressed, or hopeless 2   3.  Trouble falling or staying asleep, or sleeping too much 2   4.  Feeling tired or having little energy 2   5.  Poor appetite or overeating 1   6.  Feeling bad about yourself 1   7.  Trouble concentrating 3   8.  Moving slowly or restless 3   Q9: Thoughts of better off dead/self-harm past 2 weeks 0   PHQ-9 Total Score 16       Follow Up Actions Taken  Patient declined referral.  Follow up recommended: in 2-3 weeks or sooner if patient desires.     Nupur Thibodeaux MD  [facility]    Christine Harry is a 32 year old, presenting for the following health issues:  Chest Pain and Anxiety         View : No data to display.              HPI     Anxiety  - Is currently on sertraline. Took it for 3 days and couldn't handle the headaches so stopped taking it  - Went to ED recently due to chest pain. Anxiety has  been flaring up every day now.  - Works from home. When she logs into work, notices she starts having chest pains. When she steps away from her computer, it starts to get better.   - Current smoker 1-2 cigarette every 2-3 days.  - No ETOH or other drug use.  - Feels dull achy chest pain. Not sharp. No radiation. Sometimes with chest tightness, endorses shortness of breath.  - Hasn't tried anything to help with panic attacks  - Hasn't started therapy as she's very busy.    PHQ16, GAD7 21 today.      Review of Systems   Constitutional, HEENT, cardiovascular, pulmonary, gi and gu systems are negative, except as otherwise noted.      Objective    /82   Pulse 85   Resp 20   Wt 60.8 kg (134 lb)   LMP 03/01/2023   SpO2 99%   BMI 26.17 kg/m    Body mass index is 26.17 kg/m .  Physical Exam   GENERAL: healthy, alert and no distress  RESP: lungs clear to auscultation - no rales, rhonchi or wheezes  CV: regular rate and rhythm, normal S1 S2, no S3 or S4, no murmur, click or rub, no peripheral edema and peripheral pulses strong  MS: no gross musculoskeletal defects noted, no edema  PSYCH: mentation appears normal, affect normal/bright

## 2023-04-10 NOTE — PROGRESS NOTES
Preceptor Attestation:   Patient seen, evaluated and discussed with the resident. I have verified the content of the note, which accurately reflects my assessment of the patient and the plan of care.   Supervising Physician:  Jan Abreu MD

## 2023-04-11 RX ORDER — ALBUTEROL SULFATE 90 UG/1
2 AEROSOL, METERED RESPIRATORY (INHALATION) EVERY 4 HOURS PRN
Qty: 18 G | Refills: 1 | Status: SHIPPED | OUTPATIENT
Start: 2023-04-11 | End: 2023-12-26

## 2023-04-12 RX ORDER — ESCITALOPRAM OXALATE 10 MG/1
10 TABLET ORAL DAILY
Qty: 30 TABLET | Refills: 0 | Status: SHIPPED | OUTPATIENT
Start: 2023-04-12 | End: 2023-05-11

## 2023-04-13 PROBLEM — F32.1 CURRENT MODERATE EPISODE OF MAJOR DEPRESSIVE DISORDER, UNSPECIFIED WHETHER RECURRENT (H): Status: ACTIVE | Noted: 2023-04-13

## 2023-04-16 ENCOUNTER — HEALTH MAINTENANCE LETTER (OUTPATIENT)
Age: 33
End: 2023-04-16

## 2023-05-10 DIAGNOSIS — F32.1 CURRENT MODERATE EPISODE OF MAJOR DEPRESSIVE DISORDER, UNSPECIFIED WHETHER RECURRENT (H): ICD-10-CM

## 2023-05-11 RX ORDER — ESCITALOPRAM OXALATE 10 MG/1
TABLET ORAL
Qty: 30 TABLET | Refills: 0 | Status: SHIPPED | OUTPATIENT
Start: 2023-05-11 | End: 2023-12-26 | Stop reason: SINTOL

## 2023-05-11 NOTE — TELEPHONE ENCOUNTER
Last seen at [facility], looks like Dr. Abreu was precepting.    Sending to primary    Veronica Alcantara RN

## 2023-06-13 ENCOUNTER — OFFICE VISIT (OUTPATIENT)
Dept: FAMILY MEDICINE | Facility: CLINIC | Age: 33
End: 2023-06-13
Payer: COMMERCIAL

## 2023-06-13 VITALS
SYSTOLIC BLOOD PRESSURE: 121 MMHG | HEIGHT: 60 IN | DIASTOLIC BLOOD PRESSURE: 83 MMHG | BODY MASS INDEX: 26.31 KG/M2 | OXYGEN SATURATION: 99 % | HEART RATE: 61 BPM | WEIGHT: 134 LBS | TEMPERATURE: 98.2 F

## 2023-06-13 DIAGNOSIS — F41.0 PANIC ATTACK: Primary | ICD-10-CM

## 2023-06-13 DIAGNOSIS — F32.1 CURRENT MODERATE EPISODE OF MAJOR DEPRESSIVE DISORDER, UNSPECIFIED WHETHER RECURRENT (H): ICD-10-CM

## 2023-06-13 PROCEDURE — 99213 OFFICE O/P EST LOW 20 MIN: CPT | Mod: GC | Performed by: STUDENT IN AN ORGANIZED HEALTH CARE EDUCATION/TRAINING PROGRAM

## 2023-06-13 RX ORDER — HYDROXYZINE HYDROCHLORIDE 25 MG/1
25 TABLET, FILM COATED ORAL 3 TIMES DAILY PRN
Qty: 90 TABLET | Refills: 1 | Status: SHIPPED | OUTPATIENT
Start: 2023-06-13 | End: 2024-05-02

## 2023-06-13 ASSESSMENT — ASTHMA QUESTIONNAIRES
QUESTION_2 LAST FOUR WEEKS HOW OFTEN HAVE YOU HAD SHORTNESS OF BREATH: ONCE OR TWICE A WEEK
QUESTION_5 LAST FOUR WEEKS HOW WOULD YOU RATE YOUR ASTHMA CONTROL: WELL CONTROLLED
QUESTION_4 LAST FOUR WEEKS HOW OFTEN HAVE YOU USED YOUR RESCUE INHALER OR NEBULIZER MEDICATION (SUCH AS ALBUTEROL): ONE OR TWO TIMES PER DAY
QUESTION_3 LAST FOUR WEEKS HOW OFTEN DID YOUR ASTHMA SYMPTOMS (WHEEZING, COUGHING, SHORTNESS OF BREATH, CHEST TIGHTNESS OR PAIN) WAKE YOU UP AT NIGHT OR EARLIER THAN USUAL IN THE MORNING: ONCE OR TWICE
ACT_TOTALSCORE: 18
QUESTION_1 LAST FOUR WEEKS HOW MUCH OF THE TIME DID YOUR ASTHMA KEEP YOU FROM GETTING AS MUCH DONE AT WORK, SCHOOL OR AT HOME: A LITTLE OF THE TIME
ACT_TOTALSCORE: 18

## 2023-06-13 ASSESSMENT — PATIENT HEALTH QUESTIONNAIRE - PHQ9: SUM OF ALL RESPONSES TO PHQ QUESTIONS 1-9: 13

## 2023-06-13 NOTE — PROGRESS NOTES
Assessment and Plan     (F41.0) Panic attack  (primary encounter diagnosis)  (F32.1) Current moderate episode of major depressive disorder, unspecified whether recurrent (H)  Comment: History of anxiety and depression with panic attacks. Last seen in April and switched from Zoloft to Lexapro, she has not started taking the Lexapro due to fears that she won't tolerate it. Still struggles with anxiety, needing to take a full or half day off a few times a week. Offered mental health referral, declined today due to busy schedule. Overall feels she is coping better, denies SI. FMLA paperwork filled out today to allow for flexibility in missing days due to mental health. (Scanned into chart). Refilled atarax.   Plan: hydrOXYzine (ATARAX) 25 MG tablet            Options for treatment and follow-up care were reviewed with the patient and/or guardian. Piero Villarreal and/or guardian engaged in the decision making process and verbalized understanding of the options discussed and agreed with the final plan.    Bhumi Sotomayor MD      Precepted today with: Rosenstein, MD           HPI:       Piero Villarreal is a 33 year old  female with a significant past medical history of anxiety and panic attacks who presents for the following below:    Paperwork   - FMLA due to mental health   - Prescribed Lexapro but has not started yet  - Takes hydroxyzine which is helpful  - Misses 2-3 days or half days due to anxiety/panic attacks  - Denies thoughts of wanting to harm herself   - PHQ-9 of 14 today            PMHX:     Patient Active Problem List   Diagnosis     Mild persistent asthma     Pap smear for cervical cancer screening     Prior pregnancy with congenital cardiac defect, antepartum     Tobacco use disorder     Adjustment disorder with anxious mood     Palpitations     Current moderate episode of major depressive disorder, unspecified whether recurrent (H)       Current Outpatient Medications   Medication Sig Dispense Refill     albuterol  "(PROAIR HFA/PROVENTIL HFA/VENTOLIN HFA) 108 (90 Base) MCG/ACT inhaler Inhale 2 puffs into the lungs every 4 hours as needed for shortness of breath or wheezing 18 g 1     albuterol (PROVENTIL) (2.5 MG/3ML) 0.083% neb solution Take 1 vial (2.5 mg) by nebulization every 6 hours as needed for shortness of breath / dyspnea or wheezing 3 mL 1     escitalopram (LEXAPRO) 10 MG tablet TAKE 1 TABLET(10 MG) BY MOUTH DAILY 30 tablet 0     fluticasone (FLOVENT HFA) 220 MCG/ACT inhaler Inhale 1 puff into the lungs 2 times daily 12 g 3     hydrOXYzine (ATARAX) 25 MG tablet Take 1 tablet (25 mg) by mouth 3 times daily as needed for anxiety 90 tablet 1       Social History     Tobacco Use     Smoking status: Some Days     Packs/day: 0.50     Types: Cigarettes     Smokeless tobacco: Never   Substance Use Topics     Alcohol use: Not Currently     Alcohol/week: 0.0 standard drinks of alcohol     Comment: ocassionally     Drug use: No          Allergies   Allergen Reactions     Cats        No results found for this or any previous visit (from the past 24 hour(s)).         Review of Systems:     10 point ROS negative except for what is noted in HPI          Physical Exam:     Vitals:    06/13/23 1059   BP: 121/83   Pulse: 61   Temp: 98.2  F (36.8  C)   TempSrc: Tympanic   SpO2: 99%   Weight: 60.8 kg (134 lb)   Height: 1.511 m (4' 11.5\")     Body mass index is 26.61 kg/m .      GENERAL APPEARANCE: healthy, alert and no distress,  EYES: Eyes grossly normal to inspection  MS: extremities normal- no gross deformities noted  SKIN: no suspicious lesions or rashes  PSYCH: crying    "

## 2023-06-14 ENCOUNTER — MYC MEDICAL ADVICE (OUTPATIENT)
Dept: FAMILY MEDICINE | Facility: CLINIC | Age: 33
End: 2023-06-14
Payer: COMMERCIAL

## 2023-06-14 ENCOUNTER — DOCUMENTATION ONLY (OUTPATIENT)
Dept: FAMILY MEDICINE | Facility: CLINIC | Age: 33
End: 2023-06-14
Payer: COMMERCIAL

## 2023-06-14 NOTE — PROGRESS NOTES
Name of Sender: [employer]  Phone # of Sender: [phone]  Fax #: [phone]  Reason for form: FMLA - Anxiety/Panic Attacks (11/29/22-10/16/23)  Name of Provider to sign: Dr. Sotomayor  Date Received: 6/14/23  Next Steps: Form placed in folder to be given to provider

## 2023-06-19 NOTE — PROGRESS NOTES
Preceptor Attestation:   Patient seen, evaluated and discussed with the resident Dr. Sotomayor. I have verified the content of the note, which accurately reflects my assessment of the patient and the plan of care.  Supervising Physician:Benjamin Rosenstein, MD, MA  [facility]

## 2023-07-05 ENCOUNTER — PATIENT OUTREACH (OUTPATIENT)
Dept: FAMILY MEDICINE | Facility: CLINIC | Age: 33
End: 2023-07-05
Payer: COMMERCIAL

## 2023-07-05 NOTE — TELEPHONE ENCOUNTER
Patient Quality Outreach    Patient is due for the following:   Asthma  -  Asthma follow-up visit    Next Steps:   Schedule a office visit for Asthma    Type of outreach:    Phone, left message for patient/parent to call back.    Next Steps:  Reach out within 90 days via Phone.    Max number of attempts reached: No. Will try again in 90 days if patient still on fail list.    Questions for provider review:    None           Petrona Osuna, CMA

## 2023-12-26 ENCOUNTER — OFFICE VISIT (OUTPATIENT)
Dept: FAMILY MEDICINE | Facility: CLINIC | Age: 33
End: 2023-12-26
Payer: COMMERCIAL

## 2023-12-26 VITALS
HEIGHT: 60 IN | DIASTOLIC BLOOD PRESSURE: 78 MMHG | OXYGEN SATURATION: 99 % | BODY MASS INDEX: 28.07 KG/M2 | RESPIRATION RATE: 18 BRPM | HEART RATE: 64 BPM | SYSTOLIC BLOOD PRESSURE: 113 MMHG | WEIGHT: 143 LBS

## 2023-12-26 DIAGNOSIS — F41.0 PANIC DISORDER: ICD-10-CM

## 2023-12-26 DIAGNOSIS — J45.30 MILD PERSISTENT ASTHMA WITHOUT COMPLICATION: Primary | ICD-10-CM

## 2023-12-26 PROCEDURE — 99214 OFFICE O/P EST MOD 30 MIN: CPT | Mod: GC

## 2023-12-26 RX ORDER — FLUTICASONE PROPIONATE 220 UG/1
1 AEROSOL, METERED RESPIRATORY (INHALATION) 2 TIMES DAILY
Qty: 12 G | Refills: 3 | Status: SHIPPED | OUTPATIENT
Start: 2023-12-26 | End: 2023-12-28

## 2023-12-26 RX ORDER — ALBUTEROL SULFATE 0.83 MG/ML
2.5 SOLUTION RESPIRATORY (INHALATION) EVERY 6 HOURS PRN
Qty: 3 ML | Refills: 1 | Status: SHIPPED | OUTPATIENT
Start: 2023-12-26

## 2023-12-26 RX ORDER — ALBUTEROL SULFATE 90 UG/1
2 AEROSOL, METERED RESPIRATORY (INHALATION) EVERY 4 HOURS PRN
Qty: 18 G | Refills: 1 | Status: SHIPPED | OUTPATIENT
Start: 2023-12-26 | End: 2024-04-15

## 2023-12-26 NOTE — PROGRESS NOTES
Assessment & Plan     Mild persistent asthma without complication  Recommended using flovent consistently every day, can start with once daily but ideally would use twice per day and not use the albuterol at all. Refills sent.  - albuterol (PROAIR HFA/PROVENTIL HFA/VENTOLIN HFA) 108 (90 Base) MCG/ACT inhaler  Dispense: 18 g; Refill: 1  - albuterol (PROVENTIL) (2.5 MG/3ML) 0.083% neb solution  Dispense: 3 mL; Refill: 1  - fluticasone (FLOVENT HFA) 220 MCG/ACT inhaler  Dispense: 12 g; Refill: 3    Panic disorder  Filled out FMLA paperwork for her - takes extended breaks/days off due to panic attacks. Has not tolerated zoloft or lexapro in the past (felt like it made her anxiety worse). Uses hydroxyzine as needed. Does not feel like she has time for therapy. Discussed returning for preventative visit with Dr. Mendez and to discuss her mental health concerns further, as time off for panic attacks when we are not optimally treating them, is not ideal.    Renata Ortega MD PGY-3  [facility]    Precepted with: Dr. Salas    Christine Harry is a 33 year old, presenting for the following health issues:  Forms (FLMA) and Refill Request (Inhaler )    Here for renewal of her FMLA form - needs extended breaks/unexpected days off due to anxiety and panic attacks. Has been going on a few years. Works as a  for [employer]. Was on zoloft, didn't like how it made her feel. Tried lexapro for a week and felt like it made her anxiety worse. Uses hydroxyzine which is helpful. Does not have time for therapy.     Uses her flovent occasionally in the mornings. Using albuterol every couple of days. Feels like her breathing is doing well unless it gets really cold outside.       Objective    /78 (BP Location: Right arm, Patient Position: Sitting, Cuff Size: Adult Regular)   Pulse 64   Resp 18   Ht 1.524 m (5')   Wt 64.9 kg (143 lb)   LMP 12/08/2023   SpO2 99%   BMI 27.93 kg/m    Body mass index is  27.93 kg/m .  Physical Exam   GEN: Patient sitting comfortably in no acute distress.  HEEN: Head is atraumatic, normocephalic, eyes anicteric, mucous membranes moist.  CV: Regular rate and rhythm without obvious murmurs.  PULM: Clear to auscultation bilaterally without wheezing or rales.  ABD: Soft, nontender, bowel sounds present.  NEURO: Alert and oriented x3.  No focal motor abnormalities.  Face symmetric.  PSYCH: Appropriate affect.  SKIN: No rashes, bruising, or other lesions.

## 2023-12-26 NOTE — PROGRESS NOTES
Preceptor Attestation:   Patient seen, evaluated and discussed with the resident. I have verified the content of the note, which accurately reflects my assessment of the patient and the plan of care.  Supervising Physician:Candi Salas MD  [facility]

## 2023-12-28 ENCOUNTER — TELEPHONE (OUTPATIENT)
Dept: FAMILY MEDICINE | Facility: CLINIC | Age: 33
End: 2023-12-28
Payer: COMMERCIAL

## 2023-12-28 DIAGNOSIS — J45.30 MILD PERSISTENT ASTHMA WITHOUT COMPLICATION: Primary | ICD-10-CM

## 2023-12-28 NOTE — TELEPHONE ENCOUNTER
Prior Authorization needed on:  12/28/23    Medication:  Fluticasone Dose:  220mcg    Pharmacy confirmed as   [id number] - [facility] [place] [address] AT [place] [id number]  [facility] [address]  Phone: [phone] Fax: [phone]  : Yes    Insurance Name:    Insurance Phone: [phone]  Insurance Patient ID: [id number]    Alternatives Suggested:  Please change to alternative in class    Beckie Jasso MA December 28, 2023 at 9:31 AM

## 2024-01-02 ENCOUNTER — MYC MEDICAL ADVICE (OUTPATIENT)
Dept: FAMILY MEDICINE | Facility: CLINIC | Age: 34
End: 2024-01-02
Payer: COMMERCIAL

## 2024-02-01 DIAGNOSIS — J45.30 MILD PERSISTENT ASTHMA WITHOUT COMPLICATION: ICD-10-CM

## 2024-02-01 NOTE — TELEPHONE ENCOUNTER
"Message to physician: Fax    Date of last visit: 12/26/2023    Date of next visit if scheduled: none    Potassium   Date Value Ref Range Status   04/04/2023 4.7 3.4 - 5.3 mmol/L Final     Creatinine   Date Value Ref Range Status   04/04/2023 0.70 0.51 - 0.95 mg/dL Final     GFR Estimate   Date Value Ref Range Status   04/04/2023 >90 >60 mL/min/1.73m2 Final     Comment:     eGFR calculated using 2021 CKD-EPI equation.   11/17/2016 >60 >60 mL/min/1.73m2 Final       BP Readings from Last 3 Encounters:   12/26/23 113/78   06/13/23 121/83   04/10/23 126/82       No results found for: \"A1C\"    Please complete refill and CLOSE ENCOUNTER.  Closing the encounter signifies the refill is complete.    "

## 2024-04-15 DIAGNOSIS — J45.30 MILD PERSISTENT ASTHMA WITHOUT COMPLICATION: ICD-10-CM

## 2024-04-15 RX ORDER — ALBUTEROL SULFATE 90 UG/1
2 AEROSOL, METERED RESPIRATORY (INHALATION) EVERY 4 HOURS PRN
Qty: 18 G | Refills: 3 | Status: SHIPPED | OUTPATIENT
Start: 2024-04-15

## 2024-04-15 NOTE — TELEPHONE ENCOUNTER
"Message to physician:     Date of last visit: 12/26/2023    Date of next visit if scheduled: None    Potassium   Date Value Ref Range Status   04/04/2023 4.7 3.4 - 5.3 mmol/L Final     Creatinine   Date Value Ref Range Status   04/04/2023 0.70 0.51 - 0.95 mg/dL Final     GFR Estimate   Date Value Ref Range Status   04/04/2023 >90 >60 mL/min/1.73m2 Final     Comment:     eGFR calculated using 2021 CKD-EPI equation.   11/17/2016 >60 >60 mL/min/1.73m2 Final       BP Readings from Last 3 Encounters:   12/26/23 113/78   06/13/23 121/83   04/10/23 126/82       No results found for: \"A1C\"    Please complete refill and CLOSE ENCOUNTER.  Closing the encounter signifies the refill is complete.   "

## 2024-04-24 ENCOUNTER — VIRTUAL VISIT (OUTPATIENT)
Dept: FAMILY MEDICINE | Facility: CLINIC | Age: 34
End: 2024-04-24
Payer: COMMERCIAL

## 2024-04-24 DIAGNOSIS — Z02.89 ENCOUNTER FOR COMPLETION OF FORM WITH PATIENT: Primary | ICD-10-CM

## 2024-04-24 DIAGNOSIS — F41.0 PANIC DISORDER: ICD-10-CM

## 2024-04-24 PROCEDURE — 99212 OFFICE O/P EST SF 10 MIN: CPT | Mod: 95

## 2024-04-24 NOTE — PROGRESS NOTES
Piero is a 33 year old who is being evaluated via a billable video visit.      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     Panic disorder  Letter written as above. Continues to use hydroxyzine. Not interested in therapy or other medication options at this time. Will continue to address and encourage better control.    Renata Ortega MD PGY-3  [facility]    Precepted with: Dr. Jayden King   iPero is a 33 year old, presenting for the following health issues:  Letter Request (ADA for longer exam time no other concerns)        4/24/2024     2:53 PM   Additional Questions   Roomed by Beth'         4/24/2024   Forms   Any forms needing to be completed Yes         4/24/2024    Information    services provided? 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    Physical Exam   GENERAL: alert and no distress  EYES: Eyes grossly normal to inspection.  No discharge or erythema, or obvious scleral/conjunctival abnormalities.  RESP: No audible wheeze, cough, or visible cyanosis.    SKIN: Visible skin clear. No significant rash, abnormal pigmentation or lesions.  NEURO: Cranial nerves grossly intact.  Mentation and speech appropriate for age.  PSYCH: Appropriate affect, tone, and pace of words      Video-Visit Details    Type of service:  Video Visit   Originating Location (pt. Location): Home    Distant Location (provider location):   On-site  Platform used for Video Visit: Pradeep  Signed Electronically by: Renata Ortega MD

## 2024-04-24 NOTE — LETTER
4/24/2024        To whom it may concern,    Piero Villarreal is under my care at [facility] for Panic Disorder (F41.0).     I last saw her on 4/24 for this concern. She has tried a few medication options and currently is approved by her work-place for intermittent time off of work secondary to panic attacks.     She would benefit from 1 hour of additional testing time in the case that she has a panic attack during the examination.    Her [facility] Member ID is [id number].    Please do not hesitate to contact my office if you have any questions or concerns.    Sincerely,            Renata Ortega MD

## 2024-04-24 NOTE — Clinical Note
Could you make sure the letter from 4/24 gets printed and mailed to patient? I'm not sure if I did it correctly in communications to prompt the system to mail it to her.

## 2024-04-25 PROBLEM — F41.0 PANIC DISORDER: Status: ACTIVE | Noted: 2024-04-25

## 2024-04-26 NOTE — PROGRESS NOTES
Faculty Supervision of Residents   I saw patient over video platform and the medical care has been evaluated and discussed with the resident. See resident note outlining our discussion.      Batsheva Carpenter MD

## 2024-05-02 ENCOUNTER — TELEPHONE (OUTPATIENT)
Dept: FAMILY MEDICINE | Facility: CLINIC | Age: 34
End: 2024-05-02
Payer: COMMERCIAL

## 2024-05-02 DIAGNOSIS — F41.0 PANIC ATTACK: ICD-10-CM

## 2024-05-02 RX ORDER — HYDROXYZINE HYDROCHLORIDE 25 MG/1
25 TABLET, FILM COATED ORAL 3 TIMES DAILY PRN
Qty: 90 TABLET | Refills: 0 | Status: SHIPPED | OUTPATIENT
Start: 2024-05-02

## 2024-06-22 ENCOUNTER — HEALTH MAINTENANCE LETTER (OUTPATIENT)
Age: 34
End: 2024-06-22

## 2024-08-23 ENCOUNTER — VIRTUAL VISIT (OUTPATIENT)
Dept: FAMILY MEDICINE | Facility: CLINIC | Age: 34
End: 2024-08-23
Payer: COMMERCIAL

## 2024-08-23 DIAGNOSIS — F32.1 CURRENT MODERATE EPISODE OF MAJOR DEPRESSIVE DISORDER, UNSPECIFIED WHETHER RECURRENT (H): Primary | ICD-10-CM

## 2024-08-23 DIAGNOSIS — F41.0 PANIC DISORDER: ICD-10-CM

## 2024-08-23 PROCEDURE — 99214 OFFICE O/P EST MOD 30 MIN: CPT | Mod: 95

## 2024-08-23 RX ORDER — FLUOXETINE 10 MG/1
10 CAPSULE ORAL DAILY
Qty: 30 CAPSULE | Refills: 2 | Status: SHIPPED | OUTPATIENT
Start: 2024-08-23

## 2024-08-23 NOTE — PROGRESS NOTES
Preceptor Attestation:  Patient's case reviewed and discussed with the resident, Gerhard Burt MD, and I personally evaluated the patient. I agree with written assessment and plan of care.    Supervising Physician:  Tanja Blanton MD   [facility]

## 2024-08-23 NOTE — PROGRESS NOTES
"Family Medicine Video Visit Note  Piero is being evaluated via a billable video visit.             Video Visit Consent     Patient was verbally read the following and verbal consent was obtained.  \"Video visits are billed at different rates depending on your insurance coverage. During this emergency period, for some insurers they may be billed the same as an in-person visit.  Please reach out to your insurance provider with any questions.  If during the course of the call the physician/provider feels a video visit is not appropriate, you will not be charged for this service.\"     (Name person giving consent:  Patient   Date verbal consent given:  8/23/2024  Time verbal consent given:  3:54 PM)      Chief Complaint   Patient presents with    Forms     FMLA FORM                    HPI       Video Start Time: 4:28 PM    Piero presents to clinic today for the following health issues:    Patient wants to continue with things as they are.   Hydroxyzine as needed  Panics 2-3 times weekly and they can last all day or come and go during the day.    Hydroxyzine helps some but makes her drowsy.     Tried zoloft           Current Outpatient Medications   Medication Sig Dispense Refill    albuterol (PROAIR HFA/PROVENTIL HFA/VENTOLIN HFA) 108 (90 Base) MCG/ACT inhaler Inhale 2 puffs into the lungs every 4 hours as needed for shortness of breath or wheezing 18 g 3    albuterol (PROVENTIL) (2.5 MG/3ML) 0.083% neb solution Take 1 vial (2.5 mg) by nebulization every 6 hours as needed for shortness of breath or wheezing 3 mL 1    hydrOXYzine HCl (ATARAX) 25 MG tablet Take 1 tablet (25 mg) by mouth 3 times daily as needed for anxiety 90 tablet 0    mometasone (ASMANEX TWISTHALER) 110 MCG/ACT inhaler Inhale 1 puff into the lungs every evening 1 each 1     Allergies   Allergen Reactions    Cats               Review of Systems:     Constitutional, HEENT, cardiovascular, pulmonary, gi and gu systems are negative, except as otherwise noted.     "     Physical Exam:     There were no vitals taken for this visit.  Estimated body mass index is 27.93 kg/m  as calculated from the following:    Height as of 12/26/23: 1.524 m (5').    Weight as of 12/26/23: 64.9 kg (143 lb).    GENERAL: alert and no distress  EYES: Eyes grossly normal to inspection.  No discharge or erythema, or obvious scleral/conjunctival abnormalities.  RESP: No audible wheeze, cough, or visible cyanosis.    SKIN: Visible skin clear. No significant rash, abnormal pigmentation or lesions.  NEURO: Cranial nerves grossly intact.  Mentation and speech appropriate for age.  PSYCH: Appropriate affect, tone, and pace of words      Results from last visit:  Admission on 04/04/2023, Discharged on 04/04/2023   Component Date Value Ref Range Status    Ventricular Rate 04/04/2023 71  BPM Final    Atrial Rate 04/04/2023 71  BPM Final    TX Interval 04/04/2023 132  ms Final    QRS Duration 04/04/2023 96  ms Final    QT 04/04/2023 390  ms Final    QTc 04/04/2023 423  ms Final    P Axis 04/04/2023 70  degrees Final    R AXIS 04/04/2023 60  degrees Final    T Axis 04/04/2023 42  degrees Final    Interpretation ECG 04/04/2023    Final                    Value:Sinus rhythm with sinus arrhythmia  T wave abnormality, consider anterior ischemia  Abnormal ECG  When compared with ECG of 01-JUL-2016 16:06,  T wave inversion now evident in Anterior leads  Confirmed by SEE ED PROVIDER NOTE FOR, ECG INTERPRETATION ([id number]),  LUKE CURRAN ([id number]) on 4/5/2023 2:24:13 AM      Sodium 04/04/2023 142  136 - 145 mmol/L Final    Potassium 04/04/2023 4.7  3.4 - 5.3 mmol/L Final    Chloride 04/04/2023 106  98 - 107 mmol/L Final    Carbon Dioxide (CO2) 04/04/2023 26  22 - 29 mmol/L Final    Anion Gap 04/04/2023 10  7 - 15 mmol/L Final    Urea Nitrogen 04/04/2023 13.8  6.0 - 20.0 mg/dL Final    Creatinine 04/04/2023 0.70  0.51 - 0.95 mg/dL Final    Calcium 04/04/2023 9.8  8.6 - 10.0 mg/dL Final    Glucose 04/04/2023 128 (H)  70  - 99 mg/dL Final    GFR Estimate 04/04/2023 >90  >60 mL/min/1.73m2 Final    eGFR calculated using 2021 CKD-EPI equation.    WBC Count 04/04/2023 10.3  4.0 - 11.0 10e3/uL Final    RBC Count 04/04/2023 4.55  3.80 - 5.20 10e6/uL Final    Hemoglobin 04/04/2023 14.1  11.7 - 15.7 g/dL Final    Hematocrit 04/04/2023 42.3  35.0 - 47.0 % Final    MCV 04/04/2023 93  78 - 100 fL Final    MCH 04/04/2023 31.0  26.5 - 33.0 pg Final    MCHC 04/04/2023 33.3  31.5 - 36.5 g/dL Final    RDW 04/04/2023 12.9  10.0 - 15.0 % Final    Platelet Count 04/04/2023 211  150 - 450 10e3/uL Final    Troponin T, High Sensitivity 04/04/2023 <6  <=14 ng/L Final    Either a High Sensitivity Troponin T baseline (0 hours) value = 100 ng/L, or an increase in High Sensitivity Troponin T = 7 ng/L at 2 hours compared to 0 hours (2-0 hours), suggests myocardial injury, and urgent clinical attention is required.    If the 2-0 hours increase is <7 ng/L, a High Sensitivity Troponin T result above gender-specific reference ranges warrants further evaluation.   Recommendations for further evaluation include correlation with clinical decision-making tool (e.g., HEART), a 3rd High Sensitivity Troponin T test 2 hours after the 2nd (a 20% change from baseline would represent concern), admission for observation, close PCC/cardiology follow-up, or urgent outpatient provocative testing.    TSH 04/04/2023 3.03  0.30 - 4.20 uIU/mL Final             Assessment and Plan   (F32.1) Current moderate episode of major depressive disorder, unspecified whether recurrent (H)  (primary encounter diagnosis)  (F41.0) Panic disorder  Comment: Filled out [place] paperwork for her - takes extended breaks/days off due to panic attacks. Has not tolerated zoloft or lexapro in the past (felt like it made her anxiety worse).  Willing to try new medication.  Uses hydroxyzine as needed. Does not feel like she has time for therapy.  FMLA paperwork filled out.  Patient does not want to start therapy  now but is willing to start a new medication2=    Plan: FLUoxetine (PROZAC) 10 MG capsule        -FMLA paperwork filled out   -Reviewed side effects of Prozac including black box warning   -Patient will start the medication on the weekend when her  is around to see how she reacts      After Visit Information:  Patient declined AVS       No follow-ups on file.      Video-Visit Details    Type of service:  Video Visit    Video End Time (time video stopped): 5:00 PM    Originating Location (pt. Location): Home    Distant Location (provider location):  [facility]     Platform used for Video Visit: Pradeep Burt MD  I precepted today with Dr. Blanton

## 2024-08-26 NOTE — NURSING NOTE
Forms Request:  Today's Date: August 26, 2024   Form Type: FMLA, INTERMITTENT RECERTIFICATION REQUEST  Where is the form from: [employer]  How was form received: Fax  SLICK on file: YES - Date:    How is form being returned: Fax  Fax Number/Address: [phone]  PCP: Akira Santoyo Team: Blue Team  Form Given to: ARTURO VIDEO VISIT    Date form completed: 8/23/24  Form sent via: Fax  Date faxed or mailed: 8/26/24  Fax # or Address: [phone]  Completed by: Smiley Tilley MA      FORMS FAXED, PER PT WANTED COPY MAILED. MAILED ON 8/26/24 BY BHAVYA THOMASON.

## 2024-10-31 ENCOUNTER — MYC REFILL (OUTPATIENT)
Dept: FAMILY MEDICINE | Facility: CLINIC | Age: 34
End: 2024-10-31
Payer: COMMERCIAL

## 2024-10-31 DIAGNOSIS — J45.30 MILD PERSISTENT ASTHMA WITHOUT COMPLICATION: ICD-10-CM

## 2024-10-31 RX ORDER — ALBUTEROL SULFATE 90 UG/1
2 INHALANT RESPIRATORY (INHALATION) EVERY 4 HOURS PRN
Qty: 18 G | Refills: 0 | Status: SHIPPED | OUTPATIENT
Start: 2024-10-31

## 2024-10-31 ASSESSMENT — ASTHMA QUESTIONNAIRES
ACT_TOTALSCORE: 16
QUESTION_2 LAST FOUR WEEKS HOW OFTEN HAVE YOU HAD SHORTNESS OF BREATH: THREE TO SIX TIMES A WEEK
QUESTION_4 LAST FOUR WEEKS HOW OFTEN HAVE YOU USED YOUR RESCUE INHALER OR NEBULIZER MEDICATION (SUCH AS ALBUTEROL): TWO OR THREE TIMES PER WEEK
QUESTION_1 LAST FOUR WEEKS HOW MUCH OF THE TIME DID YOUR ASTHMA KEEP YOU FROM GETTING AS MUCH DONE AT WORK, SCHOOL OR AT HOME: SOME OF THE TIME
QUESTION_5 LAST FOUR WEEKS HOW WOULD YOU RATE YOUR ASTHMA CONTROL: SOMEWHAT CONTROLLED
QUESTION_3 LAST FOUR WEEKS HOW OFTEN DID YOUR ASTHMA SYMPTOMS (WHEEZING, COUGHING, SHORTNESS OF BREATH, CHEST TIGHTNESS OR PAIN) WAKE YOU UP AT NIGHT OR EARLIER THAN USUAL IN THE MORNING: ONCE OR TWICE
ACT_TOTALSCORE: 16

## 2024-10-31 NOTE — TELEPHONE ENCOUNTER
Patient needs updated asthma assessment. Rupeetalk message sent to patient with attached questions. Sending one month of medication to allow time to complete asthma assessment. If assessment out of range, recommend appointment with PCP prior to needing next refill. Britney SUAREZ

## 2024-11-06 ENCOUNTER — HOSPITAL ENCOUNTER (OUTPATIENT)
Facility: HOSPITAL | Age: 34
Setting detail: OBSERVATION
Discharge: HOME | End: 2024-11-07
Attending: STUDENT IN AN ORGANIZED HEALTH CARE EDUCATION/TRAINING PROGRAM | Admitting: FAMILY MEDICINE
Payer: COMMERCIAL

## 2024-11-06 DIAGNOSIS — K81.0 ACUTE CHOLECYSTITIS: ICD-10-CM

## 2024-11-06 LAB
ALBUMIN SERPL BCG-MCNC: 4.7 G/DL (ref 3.5–5.2)
ALBUMIN UR-MCNC: NEGATIVE MG/DL
ALP SERPL-CCNC: 56 U/L (ref 40–150)
ALT SERPL W P-5'-P-CCNC: 18 U/L (ref 0–50)
ANION GAP SERPL CALCULATED.3IONS-SCNC: 12 MMOL/L (ref 7–15)
APPEARANCE UR: CLEAR
AST SERPL W P-5'-P-CCNC: 17 U/L (ref 0–45)
BASOPHILS # BLD AUTO: 0.1 10E3/UL (ref 0–0.2)
BASOPHILS NFR BLD AUTO: 1 %
BILIRUB SERPL-MCNC: 0.3 MG/DL
BILIRUB UR QL STRIP: NEGATIVE
BUN SERPL-MCNC: 8.5 MG/DL (ref 6–20)
CALCIUM SERPL-MCNC: 9.4 MG/DL (ref 8.8–10.4)
CHLORIDE SERPL-SCNC: 105 MMOL/L (ref 98–107)
COLOR UR AUTO: YELLOW
CREAT SERPL-MCNC: 0.72 MG/DL (ref 0.51–0.95)
EGFRCR SERPLBLD CKD-EPI 2021: >90 ML/MIN/1.73M2
EOSINOPHIL # BLD AUTO: 0.3 10E3/UL (ref 0–0.7)
EOSINOPHIL NFR BLD AUTO: 2 %
ERYTHROCYTE [DISTWIDTH] IN BLOOD BY AUTOMATED COUNT: 13.3 % (ref 10–15)
GLUCOSE SERPL-MCNC: 114 MG/DL (ref 70–99)
GLUCOSE UR STRIP-MCNC: NEGATIVE MG/DL
HCO3 SERPL-SCNC: 23 MMOL/L (ref 22–29)
HCT VFR BLD AUTO: 41.7 % (ref 35–47)
HGB BLD-MCNC: 13.8 G/DL (ref 11.7–15.7)
HGB UR QL STRIP: NEGATIVE
IMM GRANULOCYTES # BLD: 0.1 10E3/UL
IMM GRANULOCYTES NFR BLD: 0 %
KETONES UR STRIP-MCNC: NEGATIVE MG/DL
LEUKOCYTE ESTERASE UR QL STRIP: NEGATIVE
LIPASE SERPL-CCNC: 35 U/L (ref 13–60)
LYMPHOCYTES # BLD AUTO: 2.8 10E3/UL (ref 0.8–5.3)
LYMPHOCYTES NFR BLD AUTO: 25 %
MCH RBC QN AUTO: 30.9 PG (ref 26.5–33)
MCHC RBC AUTO-ENTMCNC: 33.1 G/DL (ref 31.5–36.5)
MCV RBC AUTO: 94 FL (ref 78–100)
MONOCYTES # BLD AUTO: 0.9 10E3/UL (ref 0–1.3)
MONOCYTES NFR BLD AUTO: 8 %
MUCOUS THREADS #/AREA URNS LPF: PRESENT /LPF
NEUTROPHILS # BLD AUTO: 7.1 10E3/UL (ref 1.6–8.3)
NEUTROPHILS NFR BLD AUTO: 64 %
NITRATE UR QL: NEGATIVE
NRBC # BLD AUTO: 0 10E3/UL
NRBC BLD AUTO-RTO: 0 /100
PH UR STRIP: 6.5 [PH] (ref 5–7)
PLATELET # BLD AUTO: 202 10E3/UL (ref 150–450)
POTASSIUM SERPL-SCNC: 4.2 MMOL/L (ref 3.4–5.3)
PROT SERPL-MCNC: 7.8 G/DL (ref 6.4–8.3)
RBC # BLD AUTO: 4.46 10E6/UL (ref 3.8–5.2)
RBC URINE: 0 /HPF
SODIUM SERPL-SCNC: 140 MMOL/L (ref 135–145)
SP GR UR STRIP: 1.02 (ref 1–1.03)
SQUAMOUS EPITHELIAL: 1 /HPF
UROBILINOGEN UR STRIP-MCNC: <2 MG/DL
WBC # BLD AUTO: 11.2 10E3/UL (ref 4–11)
WBC URINE: <1 /HPF

## 2024-11-06 PROCEDURE — 83690 ASSAY OF LIPASE: CPT | Performed by: STUDENT IN AN ORGANIZED HEALTH CARE EDUCATION/TRAINING PROGRAM

## 2024-11-06 PROCEDURE — 96375 TX/PRO/DX INJ NEW DRUG ADDON: CPT

## 2024-11-06 PROCEDURE — 81001 URINALYSIS AUTO W/SCOPE: CPT | Performed by: STUDENT IN AN ORGANIZED HEALTH CARE EDUCATION/TRAINING PROGRAM

## 2024-11-06 PROCEDURE — 81025 URINE PREGNANCY TEST: CPT | Performed by: ANESTHESIOLOGY

## 2024-11-06 PROCEDURE — 250N000011 HC RX IP 250 OP 636: Performed by: STUDENT IN AN ORGANIZED HEALTH CARE EDUCATION/TRAINING PROGRAM

## 2024-11-06 PROCEDURE — 99285 EMERGENCY DEPT VISIT HI MDM: CPT | Mod: 25

## 2024-11-06 PROCEDURE — 85025 COMPLETE CBC W/AUTO DIFF WBC: CPT | Performed by: STUDENT IN AN ORGANIZED HEALTH CARE EDUCATION/TRAINING PROGRAM

## 2024-11-06 PROCEDURE — 80053 COMPREHEN METABOLIC PANEL: CPT | Performed by: STUDENT IN AN ORGANIZED HEALTH CARE EDUCATION/TRAINING PROGRAM

## 2024-11-06 PROCEDURE — 82310 ASSAY OF CALCIUM: CPT | Performed by: STUDENT IN AN ORGANIZED HEALTH CARE EDUCATION/TRAINING PROGRAM

## 2024-11-06 PROCEDURE — 36415 COLL VENOUS BLD VENIPUNCTURE: CPT | Performed by: STUDENT IN AN ORGANIZED HEALTH CARE EDUCATION/TRAINING PROGRAM

## 2024-11-06 PROCEDURE — 96374 THER/PROPH/DIAG INJ IV PUSH: CPT

## 2024-11-06 RX ORDER — KETOROLAC TROMETHAMINE 15 MG/ML
15 INJECTION, SOLUTION INTRAMUSCULAR; INTRAVENOUS ONCE
Status: COMPLETED | OUTPATIENT
Start: 2024-11-06 | End: 2024-11-06

## 2024-11-06 RX ORDER — HYDROMORPHONE HYDROCHLORIDE 1 MG/ML
0.5 INJECTION, SOLUTION INTRAMUSCULAR; INTRAVENOUS; SUBCUTANEOUS
Status: DISCONTINUED | OUTPATIENT
Start: 2024-11-06 | End: 2024-11-07 | Stop reason: HOSPADM

## 2024-11-06 RX ADMIN — KETOROLAC TROMETHAMINE 15 MG: 15 INJECTION, SOLUTION INTRAMUSCULAR; INTRAVENOUS at 21:06

## 2024-11-06 RX ADMIN — HYDROMORPHONE HYDROCHLORIDE 0.5 MG: 1 INJECTION, SOLUTION INTRAMUSCULAR; INTRAVENOUS; SUBCUTANEOUS at 23:11

## 2024-11-06 ASSESSMENT — COLUMBIA-SUICIDE SEVERITY RATING SCALE - C-SSRS
6. HAVE YOU EVER DONE ANYTHING, STARTED TO DO ANYTHING, OR PREPARED TO DO ANYTHING TO END YOUR LIFE?: NO
2. HAVE YOU ACTUALLY HAD ANY THOUGHTS OF KILLING YOURSELF IN THE PAST MONTH?: NO
1. IN THE PAST MONTH, HAVE YOU WISHED YOU WERE DEAD OR WISHED YOU COULD GO TO SLEEP AND NOT WAKE UP?: NO

## 2024-11-06 ASSESSMENT — ACTIVITIES OF DAILY LIVING (ADL): ADLS_ACUITY_SCORE: 0

## 2024-11-06 NOTE — LETTER
[facility] PHASE II  [facility] [facility] [address]  Phone: [phone]  Fax: [phone]    November 7, 2024        Piero Villarreal  [address] [place] [address]          To whom it may concern:    RE: Piero Harry was seen at [facility] between 11/6 - 11/7, please excuse her from work for these days and while she recovers.    Please contact me for questions or concerns.      Sincerely,    Bubba Mancilla MD

## 2024-11-07 ENCOUNTER — ANESTHESIA EVENT (OUTPATIENT)
Dept: SURGERY | Facility: HOSPITAL | Age: 34
End: 2024-11-07
Payer: COMMERCIAL

## 2024-11-07 ENCOUNTER — APPOINTMENT (OUTPATIENT)
Dept: ULTRASOUND IMAGING | Facility: HOSPITAL | Age: 34
End: 2024-11-07
Attending: STUDENT IN AN ORGANIZED HEALTH CARE EDUCATION/TRAINING PROGRAM
Payer: COMMERCIAL

## 2024-11-07 ENCOUNTER — ANESTHESIA (OUTPATIENT)
Dept: SURGERY | Facility: HOSPITAL | Age: 34
End: 2024-11-07
Payer: COMMERCIAL

## 2024-11-07 VITALS
HEART RATE: 64 BPM | RESPIRATION RATE: 14 BRPM | BODY MASS INDEX: 26.22 KG/M2 | WEIGHT: 130.07 LBS | TEMPERATURE: 98.2 F | DIASTOLIC BLOOD PRESSURE: 66 MMHG | OXYGEN SATURATION: 96 % | HEIGHT: 59 IN | SYSTOLIC BLOOD PRESSURE: 105 MMHG

## 2024-11-07 PROBLEM — K81.0 ACUTE CHOLECYSTITIS: Status: ACTIVE | Noted: 2024-11-07

## 2024-11-07 LAB
ANION GAP SERPL CALCULATED.3IONS-SCNC: 9 MMOL/L (ref 7–15)
BUN SERPL-MCNC: 6.8 MG/DL (ref 6–20)
CALCIUM SERPL-MCNC: 8.2 MG/DL (ref 8.8–10.4)
CHLORIDE SERPL-SCNC: 108 MMOL/L (ref 98–107)
CREAT SERPL-MCNC: 0.62 MG/DL (ref 0.51–0.95)
EGFRCR SERPLBLD CKD-EPI 2021: >90 ML/MIN/1.73M2
ERYTHROCYTE [DISTWIDTH] IN BLOOD BY AUTOMATED COUNT: 13.2 % (ref 10–15)
GLUCOSE SERPL-MCNC: 95 MG/DL (ref 70–99)
HCG UR QL: NEGATIVE
HCG UR QL: NEGATIVE
HCO3 SERPL-SCNC: 24 MMOL/L (ref 22–29)
HCT VFR BLD AUTO: 34 % (ref 35–47)
HGB BLD-MCNC: 11 G/DL (ref 11.7–15.7)
MCH RBC QN AUTO: 30.5 PG (ref 26.5–33)
MCHC RBC AUTO-ENTMCNC: 32.4 G/DL (ref 31.5–36.5)
MCV RBC AUTO: 94 FL (ref 78–100)
PLATELET # BLD AUTO: 151 10E3/UL (ref 150–450)
POTASSIUM SERPL-SCNC: 3.5 MMOL/L (ref 3.4–5.3)
RBC # BLD AUTO: 3.61 10E6/UL (ref 3.8–5.2)
SODIUM SERPL-SCNC: 141 MMOL/L (ref 135–145)
WBC # BLD AUTO: 9.7 10E3/UL (ref 4–11)

## 2024-11-07 PROCEDURE — 250N000011 HC RX IP 250 OP 636

## 2024-11-07 PROCEDURE — 76705 ECHO EXAM OF ABDOMEN: CPT

## 2024-11-07 PROCEDURE — 36415 COLL VENOUS BLD VENIPUNCTURE: CPT

## 2024-11-07 PROCEDURE — 272N000001 HC OR GENERAL SUPPLY STERILE: Performed by: SURGERY

## 2024-11-07 PROCEDURE — 99204 OFFICE O/P NEW MOD 45 MIN: CPT | Mod: 57 | Performed by: SURGERY

## 2024-11-07 PROCEDURE — 93005 ELECTROCARDIOGRAM TRACING: CPT

## 2024-11-07 PROCEDURE — 250N000011 HC RX IP 250 OP 636: Performed by: SURGERY

## 2024-11-07 PROCEDURE — 360N000076 HC SURGERY LEVEL 3, PER MIN: Performed by: SURGERY

## 2024-11-07 PROCEDURE — 370N000017 HC ANESTHESIA TECHNICAL FEE, PER MIN: Performed by: SURGERY

## 2024-11-07 PROCEDURE — 88304 TISSUE EXAM BY PATHOLOGIST: CPT | Mod: TC | Performed by: SURGERY

## 2024-11-07 PROCEDURE — 250N000011 HC RX IP 250 OP 636: Performed by: ANESTHESIOLOGY

## 2024-11-07 PROCEDURE — 47562 LAPAROSCOPIC CHOLECYSTECTOMY: CPT | Performed by: SURGERY

## 2024-11-07 PROCEDURE — 250N000011 HC RX IP 250 OP 636: Performed by: STUDENT IN AN ORGANIZED HEALTH CARE EDUCATION/TRAINING PROGRAM

## 2024-11-07 PROCEDURE — 258N000003 HC RX IP 258 OP 636: Performed by: NURSE ANESTHETIST, CERTIFIED REGISTERED

## 2024-11-07 PROCEDURE — 88304 TISSUE EXAM BY PATHOLOGIST: CPT | Mod: 26 | Performed by: PATHOLOGY

## 2024-11-07 PROCEDURE — 47562 LAPAROSCOPIC CHOLECYSTECTOMY: CPT | Mod: AS

## 2024-11-07 PROCEDURE — 81025 URINE PREGNANCY TEST: CPT | Performed by: ANESTHESIOLOGY

## 2024-11-07 PROCEDURE — 96365 THER/PROPH/DIAG IV INF INIT: CPT | Mod: XU

## 2024-11-07 PROCEDURE — P9045 ALBUMIN (HUMAN), 5%, 250 ML: HCPCS | Performed by: NURSE ANESTHETIST, CERTIFIED REGISTERED

## 2024-11-07 PROCEDURE — 99222 1ST HOSP IP/OBS MODERATE 55: CPT | Mod: AI | Performed by: FAMILY MEDICINE

## 2024-11-07 PROCEDURE — 250N000011 HC RX IP 250 OP 636: Performed by: NURSE ANESTHETIST, CERTIFIED REGISTERED

## 2024-11-07 PROCEDURE — 85027 COMPLETE CBC AUTOMATED: CPT

## 2024-11-07 PROCEDURE — 250N000009 HC RX 250: Performed by: FAMILY MEDICINE

## 2024-11-07 PROCEDURE — 250N000009 HC RX 250: Performed by: NURSE ANESTHETIST, CERTIFIED REGISTERED

## 2024-11-07 PROCEDURE — 250N000009 HC RX 250: Performed by: SURGERY

## 2024-11-07 PROCEDURE — 80048 BASIC METABOLIC PNL TOTAL CA: CPT

## 2024-11-07 PROCEDURE — 999N000141 HC STATISTIC PRE-PROCEDURE NURSING ASSESSMENT: Performed by: SURGERY

## 2024-11-07 PROCEDURE — 96376 TX/PRO/DX INJ SAME DRUG ADON: CPT

## 2024-11-07 PROCEDURE — 710N000012 HC RECOVERY PHASE 2, PER MINUTE: Performed by: SURGERY

## 2024-11-07 PROCEDURE — 250N000013 HC RX MED GY IP 250 OP 250 PS 637

## 2024-11-07 PROCEDURE — 250N000013 HC RX MED GY IP 250 OP 250 PS 637: Performed by: SURGERY

## 2024-11-07 PROCEDURE — 710N000009 HC RECOVERY PHASE 1, LEVEL 1, PER MIN: Performed by: SURGERY

## 2024-11-07 RX ORDER — AMOXICILLIN 250 MG
2 CAPSULE ORAL 2 TIMES DAILY PRN
Status: DISCONTINUED | OUTPATIENT
Start: 2024-11-07 | End: 2024-11-07 | Stop reason: HOSPADM

## 2024-11-07 RX ORDER — LIDOCAINE 40 MG/G
CREAM TOPICAL
Status: DISCONTINUED | OUTPATIENT
Start: 2024-11-07 | End: 2024-11-07 | Stop reason: HOSPADM

## 2024-11-07 RX ORDER — OXYCODONE HYDROCHLORIDE 5 MG/1
5 TABLET ORAL EVERY 4 HOURS PRN
Status: DISCONTINUED | OUTPATIENT
Start: 2024-11-07 | End: 2024-11-07 | Stop reason: HOSPADM

## 2024-11-07 RX ORDER — SCOLOPAMINE TRANSDERMAL SYSTEM 1 MG/1
1 PATCH, EXTENDED RELEASE TRANSDERMAL
Status: DISCONTINUED | OUTPATIENT
Start: 2024-11-07 | End: 2024-11-07 | Stop reason: HOSPADM

## 2024-11-07 RX ORDER — FENTANYL CITRATE 50 UG/ML
25 INJECTION, SOLUTION INTRAMUSCULAR; INTRAVENOUS EVERY 5 MIN PRN
Status: DISCONTINUED | OUTPATIENT
Start: 2024-11-07 | End: 2024-11-07 | Stop reason: HOSPADM

## 2024-11-07 RX ORDER — SODIUM CHLORIDE, SODIUM LACTATE, POTASSIUM CHLORIDE, CALCIUM CHLORIDE 600; 310; 30; 20 MG/100ML; MG/100ML; MG/100ML; MG/100ML
INJECTION, SOLUTION INTRAVENOUS CONTINUOUS
Status: DISCONTINUED | OUTPATIENT
Start: 2024-11-07 | End: 2024-11-07 | Stop reason: HOSPADM

## 2024-11-07 RX ORDER — LIDOCAINE HYDROCHLORIDE 10 MG/ML
INJECTION, SOLUTION INFILTRATION; PERINEURAL PRN
Status: DISCONTINUED | OUTPATIENT
Start: 2024-11-07 | End: 2024-11-07

## 2024-11-07 RX ORDER — SODIUM CHLORIDE 9 MG/ML
INJECTION, SOLUTION INTRAVENOUS CONTINUOUS PRN
Status: DISCONTINUED | OUTPATIENT
Start: 2024-11-07 | End: 2024-11-07

## 2024-11-07 RX ORDER — PROCHLORPERAZINE MALEATE 10 MG
10 TABLET ORAL EVERY 6 HOURS PRN
Status: DISCONTINUED | OUTPATIENT
Start: 2024-11-07 | End: 2024-11-07 | Stop reason: HOSPADM

## 2024-11-07 RX ORDER — BUPIVACAINE HYDROCHLORIDE 2.5 MG/ML
INJECTION, SOLUTION INFILTRATION; PERINEURAL PRN
Status: DISCONTINUED | OUTPATIENT
Start: 2024-11-07 | End: 2024-11-07 | Stop reason: HOSPADM

## 2024-11-07 RX ORDER — PIPERACILLIN SODIUM, TAZOBACTAM SODIUM 3; .375 G/15ML; G/15ML
3.38 INJECTION, POWDER, LYOPHILIZED, FOR SOLUTION INTRAVENOUS ONCE
Status: COMPLETED | OUTPATIENT
Start: 2024-11-07 | End: 2024-11-07

## 2024-11-07 RX ORDER — PIPERACILLIN SODIUM, TAZOBACTAM SODIUM 3; .375 G/15ML; G/15ML
3.38 INJECTION, POWDER, LYOPHILIZED, FOR SOLUTION INTRAVENOUS EVERY 8 HOURS
Status: DISCONTINUED | OUTPATIENT
Start: 2024-11-07 | End: 2024-11-07 | Stop reason: HOSPADM

## 2024-11-07 RX ORDER — CEFAZOLIN SODIUM/WATER 2 G/20 ML
2 SYRINGE (ML) INTRAVENOUS
Status: COMPLETED | OUTPATIENT
Start: 2024-11-07 | End: 2024-11-07

## 2024-11-07 RX ORDER — ONDANSETRON 2 MG/ML
4 INJECTION INTRAMUSCULAR; INTRAVENOUS EVERY 6 HOURS PRN
Status: DISCONTINUED | OUTPATIENT
Start: 2024-11-07 | End: 2024-11-07 | Stop reason: HOSPADM

## 2024-11-07 RX ORDER — ALBUTEROL SULFATE 90 UG/1
2 INHALANT RESPIRATORY (INHALATION) EVERY 4 HOURS PRN
Status: DISCONTINUED | OUTPATIENT
Start: 2024-11-07 | End: 2024-11-07 | Stop reason: HOSPADM

## 2024-11-07 RX ORDER — NALOXONE HYDROCHLORIDE 0.4 MG/ML
0.1 INJECTION, SOLUTION INTRAMUSCULAR; INTRAVENOUS; SUBCUTANEOUS
Status: DISCONTINUED | OUTPATIENT
Start: 2024-11-07 | End: 2024-11-07 | Stop reason: HOSPADM

## 2024-11-07 RX ORDER — DEXAMETHASONE SODIUM PHOSPHATE 10 MG/ML
INJECTION, SOLUTION INTRAMUSCULAR; INTRAVENOUS PRN
Status: DISCONTINUED | OUTPATIENT
Start: 2024-11-07 | End: 2024-11-07

## 2024-11-07 RX ORDER — DEXAMETHASONE SODIUM PHOSPHATE 4 MG/ML
4 INJECTION, SOLUTION INTRA-ARTICULAR; INTRALESIONAL; INTRAMUSCULAR; INTRAVENOUS; SOFT TISSUE
Status: DISCONTINUED | OUTPATIENT
Start: 2024-11-07 | End: 2024-11-07 | Stop reason: HOSPADM

## 2024-11-07 RX ORDER — HYDROMORPHONE HCL IN WATER/PF 6 MG/30 ML
0.4 PATIENT CONTROLLED ANALGESIA SYRINGE INTRAVENOUS EVERY 5 MIN PRN
Status: DISCONTINUED | OUTPATIENT
Start: 2024-11-07 | End: 2024-11-07 | Stop reason: HOSPADM

## 2024-11-07 RX ORDER — FENTANYL CITRATE 50 UG/ML
INJECTION, SOLUTION INTRAMUSCULAR; INTRAVENOUS PRN
Status: DISCONTINUED | OUTPATIENT
Start: 2024-11-07 | End: 2024-11-07

## 2024-11-07 RX ORDER — HYDROMORPHONE HCL IN WATER/PF 6 MG/30 ML
0.2 PATIENT CONTROLLED ANALGESIA SYRINGE INTRAVENOUS EVERY 5 MIN PRN
Status: DISCONTINUED | OUTPATIENT
Start: 2024-11-07 | End: 2024-11-07 | Stop reason: HOSPADM

## 2024-11-07 RX ORDER — OXYCODONE HYDROCHLORIDE 5 MG/1
5 TABLET ORAL
Status: COMPLETED | OUTPATIENT
Start: 2024-11-07 | End: 2024-11-07

## 2024-11-07 RX ORDER — ACETAMINOPHEN 325 MG/1
650 TABLET ORAL EVERY 4 HOURS PRN
Status: DISCONTINUED | OUTPATIENT
Start: 2024-11-07 | End: 2024-11-07 | Stop reason: HOSPADM

## 2024-11-07 RX ORDER — PROPOFOL 10 MG/ML
INJECTION, EMULSION INTRAVENOUS CONTINUOUS PRN
Status: DISCONTINUED | OUTPATIENT
Start: 2024-11-07 | End: 2024-11-07

## 2024-11-07 RX ORDER — SCOLOPAMINE TRANSDERMAL SYSTEM 1 MG/1
PATCH, EXTENDED RELEASE TRANSDERMAL
Status: DISCONTINUED
Start: 2024-11-07 | End: 2024-11-07 | Stop reason: HOSPADM

## 2024-11-07 RX ORDER — TRAMADOL HYDROCHLORIDE 50 MG/1
50 TABLET ORAL EVERY 6 HOURS PRN
Qty: 10 TABLET | Refills: 0 | Status: SHIPPED | OUTPATIENT
Start: 2024-11-07 | End: 2024-11-11

## 2024-11-07 RX ORDER — PROCHLORPERAZINE 25 MG
25 SUPPOSITORY, RECTAL RECTAL EVERY 12 HOURS PRN
Status: DISCONTINUED | OUTPATIENT
Start: 2024-11-07 | End: 2024-11-07 | Stop reason: HOSPADM

## 2024-11-07 RX ORDER — PIPERACILLIN SODIUM, TAZOBACTAM SODIUM 3; .375 G/15ML; G/15ML
3.38 INJECTION, POWDER, LYOPHILIZED, FOR SOLUTION INTRAVENOUS ONCE
Status: DISCONTINUED | OUTPATIENT
Start: 2024-11-07 | End: 2024-11-07

## 2024-11-07 RX ORDER — FENTANYL CITRATE 50 UG/ML
50 INJECTION, SOLUTION INTRAMUSCULAR; INTRAVENOUS EVERY 5 MIN PRN
Status: DISCONTINUED | OUTPATIENT
Start: 2024-11-07 | End: 2024-11-07 | Stop reason: HOSPADM

## 2024-11-07 RX ORDER — CALCIUM CARBONATE 500 MG/1
1000 TABLET, CHEWABLE ORAL 4 TIMES DAILY PRN
Status: DISCONTINUED | OUTPATIENT
Start: 2024-11-07 | End: 2024-11-07 | Stop reason: HOSPADM

## 2024-11-07 RX ORDER — ONDANSETRON 4 MG/1
4 TABLET, ORALLY DISINTEGRATING ORAL EVERY 30 MIN PRN
Status: DISCONTINUED | OUTPATIENT
Start: 2024-11-07 | End: 2024-11-07 | Stop reason: HOSPADM

## 2024-11-07 RX ORDER — PROPOFOL 10 MG/ML
INJECTION, EMULSION INTRAVENOUS PRN
Status: DISCONTINUED | OUTPATIENT
Start: 2024-11-07 | End: 2024-11-07

## 2024-11-07 RX ORDER — ONDANSETRON 2 MG/ML
4 INJECTION INTRAMUSCULAR; INTRAVENOUS EVERY 30 MIN PRN
Status: DISCONTINUED | OUTPATIENT
Start: 2024-11-07 | End: 2024-11-07 | Stop reason: HOSPADM

## 2024-11-07 RX ORDER — MAGNESIUM SULFATE 4 G/50ML
4 INJECTION INTRAVENOUS ONCE
Status: COMPLETED | OUTPATIENT
Start: 2024-11-07 | End: 2024-11-07

## 2024-11-07 RX ORDER — AMOXICILLIN 250 MG
1 CAPSULE ORAL 2 TIMES DAILY PRN
Status: DISCONTINUED | OUTPATIENT
Start: 2024-11-07 | End: 2024-11-07 | Stop reason: HOSPADM

## 2024-11-07 RX ORDER — ONDANSETRON 4 MG/1
4 TABLET, ORALLY DISINTEGRATING ORAL EVERY 6 HOURS PRN
Status: DISCONTINUED | OUTPATIENT
Start: 2024-11-07 | End: 2024-11-07 | Stop reason: HOSPADM

## 2024-11-07 RX ORDER — CEFAZOLIN SODIUM/WATER 2 G/20 ML
2 SYRINGE (ML) INTRAVENOUS SEE ADMIN INSTRUCTIONS
Status: DISCONTINUED | OUTPATIENT
Start: 2024-11-07 | End: 2024-11-07 | Stop reason: HOSPADM

## 2024-11-07 RX ORDER — ACETAMINOPHEN 650 MG/1
650 SUPPOSITORY RECTAL EVERY 4 HOURS PRN
Status: DISCONTINUED | OUTPATIENT
Start: 2024-11-07 | End: 2024-11-07 | Stop reason: HOSPADM

## 2024-11-07 RX ORDER — ONDANSETRON 2 MG/ML
INJECTION INTRAMUSCULAR; INTRAVENOUS PRN
Status: DISCONTINUED | OUTPATIENT
Start: 2024-11-07 | End: 2024-11-07

## 2024-11-07 RX ORDER — HYDROXYZINE HYDROCHLORIDE 25 MG/1
25 TABLET, FILM COATED ORAL 3 TIMES DAILY PRN
Status: DISCONTINUED | OUTPATIENT
Start: 2024-11-07 | End: 2024-11-07 | Stop reason: HOSPADM

## 2024-11-07 RX ADMIN — SCOPOLAMINE 1 PATCH: 1.5 PATCH, EXTENDED RELEASE TRANSDERMAL at 08:14

## 2024-11-07 RX ADMIN — SUGAMMADEX 120 MG: 100 INJECTION, SOLUTION INTRAVENOUS at 09:22

## 2024-11-07 RX ADMIN — MIDAZOLAM HYDROCHLORIDE 1 MG: 1 INJECTION, SOLUTION INTRAMUSCULAR; INTRAVENOUS at 08:25

## 2024-11-07 RX ADMIN — LIDOCAINE HYDROCHLORIDE 4 ML: 10 INJECTION, SOLUTION INFILTRATION; PERINEURAL at 08:29

## 2024-11-07 RX ADMIN — PIPERACILLIN AND TAZOBACTAM 3.38 G: 3; .375 INJECTION, POWDER, FOR SOLUTION INTRAVENOUS at 01:17

## 2024-11-07 RX ADMIN — DEXAMETHASONE SODIUM PHOSPHATE 10 MG: 10 INJECTION, SOLUTION INTRAMUSCULAR; INTRAVENOUS at 08:29

## 2024-11-07 RX ADMIN — MIDAZOLAM HYDROCHLORIDE 1 MG: 1 INJECTION, SOLUTION INTRAMUSCULAR; INTRAVENOUS at 08:17

## 2024-11-07 RX ADMIN — MAGNESIUM SULFATE HEPTAHYDRATE 4 G: 80 INJECTION, SOLUTION INTRAVENOUS at 08:10

## 2024-11-07 RX ADMIN — ROCURONIUM BROMIDE 50 MG: 50 INJECTION, SOLUTION INTRAVENOUS at 08:29

## 2024-11-07 RX ADMIN — FENTANYL CITRATE 25 MCG: 50 INJECTION, SOLUTION INTRAMUSCULAR; INTRAVENOUS at 10:06

## 2024-11-07 RX ADMIN — FENTANYL CITRATE 25 MCG: 50 INJECTION, SOLUTION INTRAMUSCULAR; INTRAVENOUS at 10:22

## 2024-11-07 RX ADMIN — ALBUMIN HUMAN: 0.05 INJECTION, SOLUTION INTRAVENOUS at 09:21

## 2024-11-07 RX ADMIN — DEXMEDETOMIDINE HYDROCHLORIDE 8 MCG: 100 INJECTION, SOLUTION INTRAVENOUS at 08:52

## 2024-11-07 RX ADMIN — ONDANSETRON 4 MG: 2 INJECTION INTRAMUSCULAR; INTRAVENOUS at 09:22

## 2024-11-07 RX ADMIN — OXYCODONE HYDROCHLORIDE 5 MG: 5 TABLET ORAL at 12:46

## 2024-11-07 RX ADMIN — FENTANYL CITRATE 100 MCG: 50 INJECTION, SOLUTION INTRAMUSCULAR; INTRAVENOUS at 08:28

## 2024-11-07 RX ADMIN — PIPERACILLIN AND TAZOBACTAM 3.38 G: 3; .375 INJECTION, POWDER, FOR SOLUTION INTRAVENOUS at 06:54

## 2024-11-07 RX ADMIN — HYDROMORPHONE HYDROCHLORIDE 0.5 MG: 1 INJECTION, SOLUTION INTRAMUSCULAR; INTRAVENOUS; SUBCUTANEOUS at 00:44

## 2024-11-07 RX ADMIN — PROPOFOL 50 MG: 10 INJECTION, EMULSION INTRAVENOUS at 08:55

## 2024-11-07 RX ADMIN — DEXMEDETOMIDINE HYDROCHLORIDE 12 MCG: 100 INJECTION, SOLUTION INTRAVENOUS at 08:42

## 2024-11-07 RX ADMIN — SCOLOPAMINE TRANSDERMAL SYSTEM 1 PATCH: 1 PATCH, EXTENDED RELEASE TRANSDERMAL at 08:14

## 2024-11-07 RX ADMIN — ACETAMINOPHEN 650 MG: 325 TABLET ORAL at 06:04

## 2024-11-07 RX ADMIN — SODIUM CHLORIDE: 9 INJECTION, SOLUTION INTRAVENOUS at 08:19

## 2024-11-07 RX ADMIN — Medication 2 G: at 08:25

## 2024-11-07 RX ADMIN — FENTANYL CITRATE 25 MCG: 50 INJECTION, SOLUTION INTRAMUSCULAR; INTRAVENOUS at 09:56

## 2024-11-07 RX ADMIN — HYDROMORPHONE HYDROCHLORIDE 0.5 MG: 1 INJECTION, SOLUTION INTRAMUSCULAR; INTRAVENOUS; SUBCUTANEOUS at 09:05

## 2024-11-07 RX ADMIN — PROPOFOL 150 MG: 10 INJECTION, EMULSION INTRAVENOUS at 08:29

## 2024-11-07 RX ADMIN — PROPOFOL 175 MCG/KG/MIN: 10 INJECTION, EMULSION INTRAVENOUS at 08:28

## 2024-11-07 RX ADMIN — ALBUMIN HUMAN: 0.05 INJECTION, SOLUTION INTRAVENOUS at 09:37

## 2024-11-07 ASSESSMENT — ACTIVITIES OF DAILY LIVING (ADL)
ADLS_ACUITY_SCORE: 0

## 2024-11-07 NOTE — ANESTHESIA PROCEDURE NOTES
Airway       Patient location during procedure: OR       Procedure Start/Stop Times: 11/7/2024 8:32 AM  Staff -        Anesthesiologist:  Hannah Gao MD       CRNA: Khloe Monreal APRN CRNA       Performed By: anesthesiologist  Consent for Airway        Urgency: elective  Indications and Patient Condition       Indications for airway management: miles-procedural       Induction type:intravenous       Mask difficulty assessment: 1 - vent by mask    Final Airway Details       Final airway type: endotracheal airway       Successful airway: ETT - single  Endotracheal Airway Details        Successful intubation technique: video laryngoscopy       VL Blade Size: Glidescope 3       Grade View of Cords: 1       Adjucts: stylet       Position: Right       Measured from: lips       Secured at (cm): 19       Bite block used: Oral Airway    Post intubation assessment        Placement verified by: capnometry, equal breath sounds and chest rise        Number of attempts at approach: 1       Secured with: tape       Ease of procedure: easy       Dentition: Intact    Medication(s) Administered   Medication Administration Time: 11/7/2024 8:32 AM         Complex Repair And V-Y Plasty Text: The defect edges were debeveled with a #15 scalpel blade.  The primary defect was closed partially with a complex linear closure.  Given the location of the remaining defect, shape of the defect and the proximity to free margins a V-Y plasty was deemed most appropriate for complete closure of the defect.  Using a sterile surgical marker, an appropriate advancement flap was drawn incorporating the defect and placing the expected incisions within the relaxed skin tension lines where possible.    The area thus outlined was incised deep to adipose tissue with a #15 scalpel blade.  The skin margins were undermined to an appropriate distance in all directions utilizing iris scissors.

## 2024-11-07 NOTE — ED PROVIDER NOTES
EMERGENCY DEPARTMENT ENCOUNTER      NAME: Piero Villarreal  AGE: 34 year old female  YOB: 1990  MRN: [id number]  EVALUATION DATE & TIME: 11/6/2024 10:18 PM    PCP: Akira Mendez    ED PROVIDER: Miguel Petersen MD      Chief Complaint   Patient presents with    Abdominal Pain    Flank Pain         FINAL IMPRESSION:  1. Acute cholecystitis          ED COURSE & MEDICAL DECISION MAKING:    Pertinent Labs & Imaging studies reviewed. (See chart for details)  34 year old female presents to the Emergency Department for evaluation of abd pain, R flank pain    ED Course as of 11/07/24 0112   Wed Nov 06, 2024   2252 I met with the patient, obtained history, performed an initial exam, and discussed options and plan for diagnostics and treatment here in the ED.   Thu Nov 07, 2024   0014 Patient is a 34-year-old female who presents the emergency department with right upper quadrant abdominal pain that radiates to her back that began this evening after eating dinner, and had similar symptoms last night after eating dinner.  However tonight, symptoms are worse because they have not resolved on their own likely did last night.  No dysuria, hematuria.  No fevers.  Never had the symptoms before.  No prior abdominal surgeries.  On examination she has positive Cano sign.  No evidence of peritonitis.  Suspect biliary pathology.  No transaminitis or pancreatitis seen on blood work.  No electrolyte abnormalities or kidney injury.  Mild leukocytosis at 11.2.  No anemia.  Urinalysis does not show evidence of infection or hematuria to suggest pyelonephritis or kidney stone.  Pending right upper quadrant ultrasound.   0057 Ultrasound of the gallbladder shows gallbladder wall thickening and cholelithiasis.  Given her history, examination, and this, suspect cholecystitis.  Will page general surgery at this time.   0101 General surgery recommends admission to medicine for medical clearance, Zosyn, and they will do her surgery later  today.       Medical Decision Making  Obtained supplemental history:Supplemental history obtained?: No  Reviewed external records: External records reviewed?: No  Care impacted by chronic illness:Documented in Chart  Care significantly affected by social determinants of health:N/A  Did you consider but not order tests?: Work up considered but not performed and documented in chart, if applicable  Did you interpret images independently?: Independent interpretation of ECG and images noted in documentation, when applicable.  Consultation discussion with other provider:Did you involve another provider (consultant, , pharmacy, etc.)?: I discussed the care with another health care provider, see documentation for details.  Admit.  Not Applicable      At the conclusion of the encounter I discussed the results of all of the tests and the disposition. The questions were answered. The patient or family acknowledged understanding and was agreeable with the care plan.     0 minutes of critical care time     MEDICATIONS GIVEN IN THE EMERGENCY:  Medications   HYDROmorphone (PF) (DILAUDID) injection 0.5 mg (0.5 mg Intravenous $Given 11/7/24 0044)   piperacillin-tazobactam (ZOSYN) 3.375 g vial to attach to  mL bag (has no administration in time range)   ketorolac (TORADOL) injection 15 mg (15 mg Intravenous $Given 11/6/24 2106)       NEW PRESCRIPTIONS STARTED AT TODAY'S ER VISIT  New Prescriptions    No medications on file          =================================================================    HPI    Patient information was obtained from: Patient    Use of : N/A        Piero Villarreal is a 34 year old female with a pertinent history of diabetes mellitus and depression who presents to this ED by walk-in with her  for evaluation of abdominal and flank pain.     Patient reports constant right-sided abdominal pain that occasionally radiates to her mid-abdomen, along with associated back pain and constipation.  The onset of this episode began approximately 1-2 hours after she had an early dinner, and she experienced a similar episode once yesterday evening (). However, she notes that this episode has lasted longer than the one from yesterday.     Denies any fevers, nausea, vomiting, melena, hematochezia, or urinary symptoms. No history of any abdominal surgeries. No daily medications she reports taking aside for her anxiety medications. Patient is otherwise in her normal state of health with no other concerns.       PAST MEDICAL HISTORY:  Past Medical History:   Diagnosis Date    Asthma     uses inhaler prn    Bacterial vaginosis 2013    Current moderate episode of major depressive disorder, unspecified whether recurrent (H) 2023    Diabetes mellitus (H)     gestational diabetes, diet controlled diet controlled    Liver mass, right lobe     6 mm, seen on US on , likely hemangioma    Monochorionic diamniotic twin gestation     cardiac abnormalities in Twin A, chest mass in Twin B,  at 21 weeks    Pelvic pain     Uncomplicated asthma     pt only gets flare ups when ill       PAST SURGICAL HISTORY:  Past Surgical History:   Procedure Laterality Date    DILATION AND CURETTAGE      NO HISTORY OF SURGERY             CURRENT MEDICATIONS:    albuterol (PROAIR HFA/PROVENTIL HFA/VENTOLIN HFA) 108 (90 Base) MCG/ACT inhaler  albuterol (PROVENTIL) (2.5 MG/3ML) 0.083% neb solution  FLUoxetine (PROZAC) 10 MG capsule  hydrOXYzine HCl (ATARAX) 25 MG tablet  mometasone (ASMANEX TWISTHALER) 110 MCG/ACT inhaler        ALLERGIES:  Allergies   Allergen Reactions    Cats        FAMILY HISTORY:  Family History   Problem Relation Age of Onset    Asthma Son     Diabetes No family hx of     Coronary Artery Disease No family hx of     Hypertension No family hx of     Breast Cancer No family hx of     Cancer - colorectal No family hx of     Ovarian Cancer No family hx of     Prostate Cancer No family hx of     Other  Cancer No family hx of     Mental Illness No family hx of     Depression/Anxiety No family hx of     Cerebrovascular Disease No family hx of     Thyroid Disease No family hx of     Obesity No family hx of     Thyroid Disease No family hx of     Genetic Disorder No family hx of     Osteoporosis No family hx of     Anesthesia Reaction No family hx of     Substance Abuse No family hx of     Mental Illness No family hx of     Anxiety Disorder No family hx of     Depression No family hx of     Colon Cancer No family hx of     Hyperlipidemia No family hx of        SOCIAL HISTORY:   Social History     Socioeconomic History    Marital status: Single   Tobacco Use    Smoking status: Some Days     Current packs/day: 0.50     Types: Cigarettes     Passive exposure: Never    Smokeless tobacco: Never   Vaping Use    Vaping status: Never Used   Substance and Sexual Activity    Alcohol use: Not Currently     Alcohol/week: 0.0 standard drinks of alcohol     Comment: ocassionally    Drug use: No    Sexual activity: Yes     Partners: Male     Birth control/protection: None   Social History Narrative    Lives with her partner and two children. Not currently working. Does smoke 1/2 ppd of cigarettes. Occasional alcohol use 2-4 times per month. No marijuana or illicit drug use.      Social Drivers of Health     Financial Resource Strain: Unknown (12/20/2023)    Financial Resource Strain     Within the past 12 months, have you or your family members you live with been unable to get utilities (heat, electricity) when it was really needed?: Patient declined   Food Insecurity: Low Risk  (12/20/2023)    Food Insecurity     Within the past 12 months, did you worry that your food would run out before you got money to buy more?: No     Within the past 12 months, did the food you bought just not last and you didn t have money to get more?: No   Transportation Needs: Unknown (12/20/2023)    Transportation Needs     Within the past 12 months, has  "lack of transportation kept you from medical appointments, getting your medicines, non-medical meetings or appointments, work, or from getting things that you need?: Patient declined   Housing Stability: Low Risk  (12/20/2023)    Housing Stability     Do you have housing? : Yes     Are you worried about losing your housing?: Patient declined       VITALS:  BP (!) 148/87   Pulse 62   Temp 98.7  F (37.1  C) (Oral)   Resp 18   Ht 1.499 m (4' 11\")   Wt 59 kg (130 lb 1.1 oz)   SpO2 100%   BMI 26.27 kg/m      PHYSICAL EXAM    Physical Exam  Vitals and nursing note reviewed.   Constitutional:       General: She is not in acute distress.     Appearance: Normal appearance. She is normal weight. She is not ill-appearing.   HENT:      Head: Normocephalic and atraumatic.      Nose: Nose normal.      Mouth/Throat:      Mouth: Mucous membranes are moist.      Pharynx: Oropharynx is clear.   Eyes:      Extraocular Movements: Extraocular movements intact.      Conjunctiva/sclera: Conjunctivae normal.      Pupils: Pupils are equal, round, and reactive to light.   Cardiovascular:      Rate and Rhythm: Normal rate and regular rhythm.      Pulses: Normal pulses.      Heart sounds: Normal heart sounds. No murmur heard.  Pulmonary:      Effort: Pulmonary effort is normal. No respiratory distress.      Breath sounds: Normal breath sounds.   Abdominal:      General: Abdomen is flat. There is no distension.      Palpations: Abdomen is soft.      Tenderness: There is abdominal tenderness (Right upper quadrant, positive Cano sign). There is no right CVA tenderness, left CVA tenderness, guarding or rebound.   Musculoskeletal:         General: Normal range of motion.      Cervical back: Normal range of motion.      Right lower leg: No edema.      Left lower leg: No edema.   Skin:     General: Skin is warm and dry.      Capillary Refill: Capillary refill takes less than 2 seconds.      Coloration: Skin is not jaundiced.   Neurological: "      General: No focal deficit present.      Mental Status: She is alert and oriented to person, place, and time. Mental status is at baseline.   Psychiatric:         Mood and Affect: Mood normal.         Behavior: Behavior normal.         Thought Content: Thought content normal.         Judgment: Judgment normal.            LAB:  All pertinent labs reviewed and interpreted.  Results for orders placed or performed during the hospital encounter of 11/06/24   US Abdomen Limited    Impression    IMPRESSION:  1.  Cholelithiasis and gallbladder wall thickening. Indeterminate sonographic Cano's sign due to pain medication. Correlate for acute cholecystitis.  2.  CBD 7 mm.       Comprehensive metabolic panel   Result Value Ref Range    Sodium 140 135 - 145 mmol/L    Potassium 4.2 3.4 - 5.3 mmol/L    Carbon Dioxide (CO2) 23 22 - 29 mmol/L    Anion Gap 12 7 - 15 mmol/L    Urea Nitrogen 8.5 6.0 - 20.0 mg/dL    Creatinine 0.72 0.51 - 0.95 mg/dL    GFR Estimate >90 >60 mL/min/1.73m2    Calcium 9.4 8.8 - 10.4 mg/dL    Chloride 105 98 - 107 mmol/L    Glucose 114 (H) 70 - 99 mg/dL    Alkaline Phosphatase 56 40 - 150 U/L    AST 17 0 - 45 U/L    ALT 18 0 - 50 U/L    Protein Total 7.8 6.4 - 8.3 g/dL    Albumin 4.7 3.5 - 5.2 g/dL    Bilirubin Total 0.3 <=1.2 mg/dL   Result Value Ref Range    Lipase 35 13 - 60 U/L   UA with Microscopic reflex to Culture    Specimen: Urine, Catheter   Result Value Ref Range    Color Urine Yellow Colorless, Straw, Light Yellow, Yellow    Appearance Urine Clear Clear    Glucose Urine Negative Negative mg/dL    Bilirubin Urine Negative Negative    Ketones Urine Negative Negative mg/dL    Specific Gravity Urine 1.020 1.001 - 1.030    Blood Urine Negative Negative    pH Urine 6.5 5.0 - 7.0    Protein Albumin Urine Negative Negative mg/dL    Urobilinogen Urine <2.0 <2.0 mg/dL    Nitrite Urine Negative Negative    Leukocyte Esterase Urine Negative Negative    Mucus Urine Present (A) None Seen /LPF    RBC  Urine 0 <=2 /HPF    WBC Urine <1 <=5 /HPF    Squamous Epithelials Urine 1 <=1 /HPF   CBC with platelets and differential   Result Value Ref Range    WBC Count 11.2 (H) 4.0 - 11.0 10e3/uL    RBC Count 4.46 3.80 - 5.20 10e6/uL    Hemoglobin 13.8 11.7 - 15.7 g/dL    Hematocrit 41.7 35.0 - 47.0 %    MCV 94 78 - 100 fL    MCH 30.9 26.5 - 33.0 pg    MCHC 33.1 31.5 - 36.5 g/dL    RDW 13.3 10.0 - 15.0 %    Platelet Count 202 150 - 450 10e3/uL    % Neutrophils 64 %    % Lymphocytes 25 %    % Monocytes 8 %    % Eosinophils 2 %    % Basophils 1 %    % Immature Granulocytes 0 %    NRBCs per 100 WBC 0 <1 /100    Absolute Neutrophils 7.1 1.6 - 8.3 10e3/uL    Absolute Lymphocytes 2.8 0.8 - 5.3 10e3/uL    Absolute Monocytes 0.9 0.0 - 1.3 10e3/uL    Absolute Eosinophils 0.3 0.0 - 0.7 10e3/uL    Absolute Basophils 0.1 0.0 - 0.2 10e3/uL    Absolute Immature Granulocytes 0.1 <=0.4 10e3/uL    Absolute NRBCs 0.0 10e3/uL       RADIOLOGY:  Reviewed all pertinent imaging. Please see official radiology report.  US Abdomen Limited   Final Result   IMPRESSION:   1.  Cholelithiasis and gallbladder wall thickening. Indeterminate sonographic Cano's sign due to pain medication. Correlate for acute cholecystitis.   2.  CBD 7 mm.                PROCEDURES:   None      [facility] Documentation:   CMS Diagnoses: IV Antibiotics given and/or elevated Lactate of 0 and no sepsis note found - Delete this reminder and enter the sepsis note or '.edcms' before signing chart.>>>             I, Nneka Villarreal, am serving as a scribe to document services personally performed by Miguel Petersen MD based on my observation and the provider's statements to me. I, Miguel Petersen MD, attest that Nneka Villarreal is acting in a scribe capacity, has observed my performance of the services and has documented them in accordance with my direction.    Miguel Petersen MD  [facility] EMERGENCY DEPARTMENT  [address]  [facility] [address]  [phone]     Miguel Petersen MD  11/07/24 0112

## 2024-11-07 NOTE — DISCHARGE SUMMARY
"[facility]  Hospitalist Discharge Summary      Date of Admission:  11/6/2024  Date of Discharge:  11/7/2024  Discharging Provider: Bubba Mancilla MD  Discharge Service: Hospitalist Service    Discharge Diagnoses   Acute cholecystitis s/p cholecystectomy  Cholelithiasis  Leukocytosis    Clinically Significant Risk Factors     # Overweight: Estimated body mass index is 26.27 kg/m  as calculated from the following:    Height as of this encounter: 1.499 m (4' 11\").    Weight as of this encounter: 59 kg (130 lb 1.1 oz).       Follow-ups Needed After Discharge   Follow-up Appointments       Hospital Follow-up with Existing Primary Care Provider (PCP)      Please see details below         Schedule Primary Care visit within: 30 Days           PCP  - Follow up routine post-op cholecystectomy      Unresulted Labs Ordered in the Past 30 Days of this Admission       Date and Time Order Name Status Description    11/7/2024  9:18 AM Surgical Pathology Exam In process         These results will be followed up by ordering provider    Discharge Disposition   Discharged to home  Condition at discharge: Stable    Hospital Course   Piero Villarreal is a 34 year old female admitted on 11/6/2024. She has a history of asthma and anxiety and is admitted for acute acalculous cholecystitis requiring surgical intervention.     Acute cholecystitis  Cholelithiasis  Leukocytosis   Patient presenting with about 8 hours of acute abdominal pain in RUQ with radiation to the back. WBC mildly elevated to 11.2 on admit. Lipase, liver enzymes wnl. US abdomen on admission showing cholelithiasis and gallbladder wall thickening to 10mm as well as a dilated common bile duct to 7mm. No other evidence to suggest bile duct stone or choledocholithiasis. Received dose of antibiotics and general surgery team accepted the patient for laparoscopic cholecystectomy. Her surgery went well, had no complications and she did well post-op. " Discharged with tramadol for post-op pain control with recommendations to follow up with PCP within 30 days.    Consultations This Hospital Stay   SURGERY GENERAL IP CONSULT    Code Status   Full Code    Time Spent on this Encounter   I, Bubba Mancilla MD, personally saw the patient today and spent less than or equal to 30 minutes discharging this patient.       Bubba Mancilla MD  [facility] PHASE II  [address] [place] [id number]  Phone: [phone]  Fax: [phone]  ______________________________________________________________________    Physical Exam   Vital Signs: Temp: 97.9  F (36.6  C) Temp src: Core BP: 94/55 Pulse: 53   Resp: 16 SpO2: 94 % O2 Device: None (Room air) Oxygen Delivery: 6 LPM  Weight: 130 lbs 1.14 oz      General: Somnolent but arousable to conversation.   Head: Normocephalic, atraumatic.  Eyes: Symmetric pupils. No conjunctival injection.  Respiratory: No signs of respiratory distress. Clear to auscultation bilaterally.  Cardiovascular: RRR, no murmur or gallop. Normal S1 and S2. Appears well perfused.  Abdomen: Normal post-op laparoscopic findings. No distension.   Neuro: Awake, A&O x3. Cranial nerves 2-12 grossly intact.  Psych: Appropriate mood and affect         Primary Care Physician   Akira Mendez    Discharge Orders      When to call - Contact Surgeon Team    Pain is not controlled by pain medicine or suddenly increases  You develop a fever greater than 101  Foul smell or drainage from your surgery sites  A large amount of bleeding that continues despite moderate pressure  Unable to pass urine within 8-10 hours of surgery     When to call - Reach out to Urgent Care    If you are experiencing uncontrolled Nausea and Vomiting, uncontrolled pain, inability to urinate and uncomfortable, and in need of immediate care, and you are NOT able to reach your Surgeon Team, go to an Urgent Care clinic.     When to call - Reasons to Call [phone]     Call 911 immediately if you experience sudden-onset chest pain, arm weakness/numbness, slurred speech, or shortness of breath     Symptoms - Fever Management    A low grade fever (<101 F) can be expected after surgery.     Symptoms - Reduced Urine Output    If it has been greater than 8 hours since you have urinated despite drinking plenty of water, call your Surgeon Team.     Weight bearing status - As tolerated     No driving or operating machinery    Do NOT drive any vehicle or operate mechanical equipment for 24 hours following the end of your surgery.  Even though you may feel normal, your reactions may be affected by Anesthesia medication you received.  Following 24 hours, you may drive as long as you are not taking narcotic medications, and can move quickly without limitations on movement due to pain.      Under no circumstances does your surgery permit you to not wear a seatbelt.     No Alcohol    Do NOT drink alcoholic beverages for 24 hours following your surgery and while taking pain medications.     Diet Instructions    Regular diet. Patients can have difficulty with constipation following surgery, due in part to the administration of narcotic medications.  If you are suffering with constipation, you should avoid foods such as hard cheeses or red meat.  Foods high in fiber are recommended.     Shower/Bathing - Restrictions: Let water run over incisions and pat dry. No tub baths until bleeding stops.    Shower/Bathing - Restrictions: You may shower 24 hours after your operation. Let water run over incisions and pat dry. No tub baths until bleeding stops. Do NOT soak in any body of water (lake, pool, bath, etc.) for 7-10 days postoperatively.     Dressing / Wound Care - Wound    You may remove your Band-aids after a period of 48 hours.  The small white strips on the incisions act like artificial scabs, and will begin to peel at the edges at around 7-10 days.  These can then be removed.     Discharge  Instructions - Comfort and Pain Management    Pain after surgery is normal and expected. You will have some amount of pain after surgery. Your pain will improve with time. There are several things you can do to help reduce your pain including: rest, ice, and using pain medications as needed. Use pain interventions and don't wait until pain level is out of control. Contact your Surgeon Team if you have pain that persists or worsens after surgery despite rest, ice, and taking your medication(s) as prescribed. You may have a dry mouth, a sore throat, muscles aches or trouble sleeping, and these symptoms should go away after 24 hours.     Discharge Instruction - Comfort and Pain Management    You may take Tylenol and Ibuprofen in their recommended over the counter strength and frequency for mild to moderate pain. Read the labels on your Over the Counter (OTC) medications carefully and with care.     Discharge Instructions - Rest    You should continue to be active at home, including ambulating frequently.  If possible try to limit the amount of time spent in bed.     Discharge Instructions - Follow-up Appointment (Weeks)    Follow up: It is our practice to have all patients follow up with us 2-3 weeks after their surgery to ensure they are recovering well.  For straightforward laparoscopic procedures, this can be done either in clinic as a scheduled follow up appointment, or over the phone.  If you would like a scheduled follow up appointment in clinic, please call us at [phone] to schedule an appointment at your convenience.  If you would prefer to follow up with us by phone please let us know so that we may contact you 2-3 weeks following your procedure.     Return to normal activity as tolerated    Return to normal activity as tolerated.  If an activity hurts, don't do it.  If it doesn't hurt, proceed cautiously for the first two weeks.     Reason for your hospital stay    You came to the hospital for right  sided abdominal pain after eating, and were found to have signs of gallbladder inflammation/infection on ultrasound. You had a gallbladder removal surgery and did well after your procedure.     Activity    Your activity upon discharge: activity as tolerated     Diet    Follow this diet upon discharge: Current Diet: advance as tolerated     Hospital Follow-up with Existing Primary Care Provider (PCP)    Please see details below            Significant Results and Procedures   Most Recent 3 CBC's:  Recent Labs   Lab Test 11/07/24  0553 11/06/24  2107 04/04/23  1825   WBC 9.7 11.2* 10.3   HGB 11.0* 13.8 14.1   MCV 94 94 93    202 211     Most Recent 3 BMP's:  Recent Labs   Lab Test 11/07/24  0553 11/06/24  2107 04/04/23  1825    140 142   POTASSIUM 3.5 4.2 4.7   CHLORIDE 108* 105 106   CO2 24 23 26   BUN 6.8 8.5 13.8   CR 0.62 0.72 0.70   ANIONGAP 9 12 10   LLOYD 8.2* 9.4 9.8   GLC 95 114* 128*       Discharge Medications   Current Discharge Medication List        START taking these medications    Details   traMADol (ULTRAM) 50 MG tablet Take 1 tablet (50 mg) by mouth every 6 hours as needed for severe pain.  Qty: 10 tablet, Refills: 0    Associated Diagnoses: Acute cholecystitis           CONTINUE these medications which have NOT CHANGED    Details   albuterol (PROAIR HFA/PROVENTIL HFA/VENTOLIN HFA) 108 (90 Base) MCG/ACT inhaler Inhale 2 puffs into the lungs every 4 hours as needed for shortness of breath or wheezing.  Qty: 18 g, Refills: 0    Comments: Pharmacy may dispense brand covered by insurance (Proair, or proventil or ventolin or generic albuterol inhaler)  Associated Diagnoses: Mild persistent asthma without complication      FLUoxetine (PROZAC) 10 MG capsule Take 1 capsule (10 mg) by mouth daily.  Qty: 30 capsule, Refills: 2    Associated Diagnoses: Current moderate episode of major depressive disorder, unspecified whether recurrent (H); Panic disorder      hydrOXYzine HCl (ATARAX) 25 MG tablet  Take 1 tablet (25 mg) by mouth 3 times daily as needed for anxiety  Qty: 90 tablet, Refills: 0    Associated Diagnoses: Panic attack           Allergies   Allergies   Allergen Reactions    Cats

## 2024-11-07 NOTE — MEDICATION SCRIBE - ADMISSION MEDICATION HISTORY
Medication Scribe Admission Medication History    Admission medication history is complete. The information provided in this note is only as accurate as the sources available at the time of the update.    Information Source(s): Patient and CareEverywhere/SureScripts via in-person    Pertinent Information: Patient manages their own medications.    Fluoxetine  -pt reports not having started taking yet. Picked up prescription back in 8/24. Requested left on her med list.    Changes made to PTA medication list:  Added: None  Deleted:   Albuterol nebs  Asmanex twisthaler  Changed: None    Allergies reviewed with patient and updates made in EHR: yes    Medication History Completed By: Julien Kwong 11/7/2024 1:16 AM    PTA Med List   Medication Sig Last Dose/Taking    albuterol (PROAIR HFA/PROVENTIL HFA/VENTOLIN HFA) 108 (90 Base) MCG/ACT inhaler Inhale 2 puffs into the lungs every 4 hours as needed for shortness of breath or wheezing. Past Week    FLUoxetine (PROZAC) 10 MG capsule Take 1 capsule (10 mg) by mouth daily. Unknown, not started taking.    hydrOXYzine HCl (ATARAX) 25 MG tablet Take 1 tablet (25 mg) by mouth 3 times daily as needed for anxiety Past Month

## 2024-11-07 NOTE — OP NOTE
[facility]  Operative Note    Pre-operative diagnosis: Acute cholecystitis [K81.0]   Post-operative diagnosis acute cholecystitis   Procedure: Procedure(s):  CHOLECYSTECTOMY, LAPAROSCOPIC   Surgeon: Jaya Villegas MD   Assistants(s): Karey Zambrano PA-C.  Assistance was necessary for laparoscope operation   Anesthesia: General Anesthetic    Estimated blood loss: 250 ml                Drains: None   Specimens: Gallbladder       Findings: None.   Complications: None.           Description of procedure:      The patient was brought to the operating room where after induction of general anesthesia with endotracheal and the patient was positioned with the left arm tucked and right arm out.  The patient was then prepped and draped in standard sterile fashion.  After a procedural pause we began by injecting quarter percent Marcaine into the skin immediately adjacent to the umbilicus.  This was then sharply incised.  We then entered with a 5 mm optical trochar.  The patient was then placed in reverse Trendelenburg and right side up the body positioning.  We then proceeded to place 3 additional trochars across the right subcostal margin.      On initial evaluation the gallbladder it appeared distended and acutely inflamed.   We began our operation by grasping the fundus the gallbladder and retracting it cephalad over the dome of the liver.  The neck of the gallbladder was then retracted lateral to the right side.  We then began by scoring the peritoneum overlying the triangle of Calot. Using primarily blunt dissection and electrocautery we proceeded to clear the fatty tissues and lymph node away from the triangle of Calot. The cystic duct and cystic artery were skeletonized. A critical view was obtained.  We then proceeded to place three clips each on the cystic artery and duct, which were then divided.  We then utilized electrocautery to dissect the remainder of the gallbladder off the  gallbladder fossa.  A posterior cystic artery branch was encountered during dissection was controlled with a second clip.  Once this was completely removed we inspected the gallbladder fossa for hemostasis which appeared excellent. The gallbladder was then placed into an Endo Catch bag. All spilled fluid and blood were then aspirated clear from the right upper quadrant and after confirming no active bleeding from the gallbladder fossa the Endo Catch bag with the gallbladder intact was removed through the 12 mm port site. An 0 Vicryl stitch was then placed under laparoscopic guidance in the 12 mm port site.  We then desufflated the abdomen and removed our ports. The preplaced fascial tie was then tied down with excellent obliteration of the fascial defect. The remainder of the local anesthetic was then injected in the skin and fascia surrounding the port sites and the skin closed with running 4-0 subcuticular sutures      Jaya Villegas MD, FACS  Office: [phone]  [facility]   General and Bariatric Surgery

## 2024-11-07 NOTE — ANESTHESIA PREPROCEDURE EVALUATION
Anesthesia Pre-Procedure Evaluation    Patient: Piero Villarreal   MRN: [id number] : 1990        Procedure : Procedure(s):  CHOLECYSTECTOMY, LAPAROSCOPIC          Past Medical History:   Diagnosis Date     Asthma     uses inhaler prn     Bacterial vaginosis 2013     Current moderate episode of major depressive disorder, unspecified whether recurrent (H) 2023     Diabetes mellitus (H)     gestational diabetes, diet controlled diet controlled     Liver mass, right lobe     6 mm, seen on US on , likely hemangioma     Monochorionic diamniotic twin gestation     cardiac abnormalities in Twin A, chest mass in Twin B,  at 21 weeks     Pelvic pain      Uncomplicated asthma     pt only gets flare ups when ill      Past Surgical History:   Procedure Laterality Date     DILATION AND CURETTAGE       NO HISTORY OF SURGERY        Allergies   Allergen Reactions     Cats       Social History     Tobacco Use     Smoking status: Some Days     Current packs/day: 0.50     Types: Cigarettes     Passive exposure: Never     Smokeless tobacco: Never   Substance Use Topics     Alcohol use: Not Currently     Alcohol/week: 0.0 standard drinks of alcohol     Comment: ocassionally      Wt Readings from Last 1 Encounters:   24 59 kg (130 lb 1.1 oz)        Anesthesia Evaluation   Pt has had prior anesthetic.     History of anesthetic complications  - motion sickness and PONV.      ROS/MED HX  ENT/Pulmonary:     (+)                      asthma                  Neurologic:  - neg neurologic ROS     Cardiovascular:  - neg cardiovascular ROS     METS/Exercise Tolerance: >4 METS    Hematologic:  - neg hematologic  ROS     Musculoskeletal:  - neg musculoskeletal ROS     GI/Hepatic:     (+)          cholecystitis/cholelithiasis (Acute cholecystitis),       (-) GERD   Renal/Genitourinary:  - neg Renal ROS     Endo: Comment: Previous h/o gestational DM      Psychiatric/Substance Use:     (+) psychiatric history  "depression       Infectious Disease:       Malignancy:       Other:            Physical Exam    Airway        Mallampati: I   TM distance: > 3 FB   Neck ROM: full   Mouth opening: > 3 cm    Respiratory Devices and Support         Dental     Comment: Good        Cardiovascular   cardiovascular exam normal          Pulmonary   pulmonary exam normal            OUTSIDE LABS:  CBC:   Lab Results   Component Value Date    WBC 9.7 11/07/2024    WBC 11.2 (H) 11/06/2024    HGB 11.0 (L) 11/07/2024    HGB 13.8 11/06/2024    HCT 34.0 (L) 11/07/2024    HCT 41.7 11/06/2024     11/07/2024     11/06/2024     BMP:   Lab Results   Component Value Date     11/07/2024     11/06/2024    POTASSIUM 3.5 11/07/2024    POTASSIUM 4.2 11/06/2024    CHLORIDE 108 (H) 11/07/2024    CHLORIDE 105 11/06/2024    CO2 24 11/07/2024    CO2 23 11/06/2024    BUN 6.8 11/07/2024    BUN 8.5 11/06/2024    CR 0.62 11/07/2024    CR 0.72 11/06/2024    GLC 95 11/07/2024     (H) 11/06/2024     COAGS: No results found for: \"PTT\", \"INR\", \"FIBR\"  POC:   Lab Results   Component Value Date    HCG NEGATIVE 08/01/2019     HEPATIC:   Lab Results   Component Value Date    ALBUMIN 4.7 11/06/2024    PROTTOTAL 7.8 11/06/2024    ALT 18 11/06/2024    AST 17 11/06/2024    ALKPHOS 56 11/06/2024    BILITOTAL 0.3 11/06/2024     OTHER:   Lab Results   Component Value Date    LLOYD 8.2 (L) 11/07/2024    LIPASE 35 11/06/2024    TSH 3.03 04/04/2023       Anesthesia Plan    ASA Status:  3    NPO Status:  NPO Appropriate    Anesthesia Type: General.     - Airway: ETT   Induction: Propofol, RSI, Intravenous.   Maintenance: TIVA.        Consents    Anesthesia Plan(s) and associated risks, benefits, and realistic alternatives discussed. Questions answered and patient/representative(s) expressed understanding.     - Discussed: Risks, Benefits and Alternatives for BOTH SEDATION and the PROCEDURE were discussed     - Discussed with:  Patient       - Patient is " "DNR/DNI Status: No     Use of blood products discussed: Yes.     - Discussed with: Patient.     - Consented: consented to blood products     Postoperative Care    Pain management: Multi-modal analgesia.   PONV prophylaxis: Ondansetron (or other 5HT-3), Dexamethasone or Solumedrol, Scopolamine patch     Comments:    Other Comments: TIVA    RSI with rocuronium             Hannah Gao MD    I have reviewed the pertinent notes and labs in the chart from the past 30 days and (re)examined the patient.  Any updates or changes from those notes are reflected in this note.      # Hyperchloremia: Highest Cl = 108 mmol/L in last 2 days, will monitor as appropriate      # Hypocalcemia: Lowest Ca = 8.2 mg/dL in last 2 days, will monitor and replace as appropriate                   # Overweight: Estimated body mass index is 26.27 kg/m  as calculated from the following:    Height as of this encounter: 1.499 m (4' 11\").    Weight as of this encounter: 59 kg (130 lb 1.1 oz).       # Asthma: noted on problem list       "

## 2024-11-07 NOTE — H&P
[facility]    History and Physical - Hospitalist Service       Date of Admission:  11/6/2024    Assessment & Plan    Piero Villarreal is a 34 year old female admitted on 11/6/2024. She has a history of asthma and anxiety and is admitted for acute acalculous cholecystitis requiring surgical intervention    Acute cholecystitis  Cholelithiasis  Leukocytosis   Patient presenting with about 8 hours of acute abdominal pain in RUQ with radiation to the back. WBC mildly elevated to 11.2 on admit. Lipase, liver enzymes wnl. US abdomen on admission showing cholelithiasis and gallbladder wall thickening to 10mm as well as a dilated common bile duct to 7mm. No other evidence to suggest bile duct stone or choledocholithiasis. Gen surg consulted by ED, plan for lap hu in AM.  - gen surg consult, appreciate recs  - with dilated CBD, consider concurrent GI consult if pain migrates or worsens  - NPO  - IV Zosyn  - Tylenol, ibuprofen  - oxycodone 5mg PO for breakthrough  - AM BMP, CBC    Preop clearance:  Preop clearance: Patient with no known risk factors.   EKG shows sinus bradycardia and no ST-T segment abnormalities or T-wave abnormalities.   Patient's functional capacity is Excellent: >7 METS: Jogging (10 minute mile), scrubbing floors, tennis  Revised Cardiac Risk Index for Pre-Operative Risk  High risk surgery? No  History of ischemic heart disease? No  History of CHF? No  History of cerebrovascular disease? No  Pre-op treatment w/ insulin? No  Pre-op creatinine > 2.0? No    0 points = class I risk 0.4% risk of major cardiac event    Medically optimized for surgery at this time.     Chronic conditions:  Asthma - mild intermittent, will hold PTA PRN albuterol MDI for surgery  Anxiety - previously on Prozac but no longer, will hold PTA hydroxyzine and Prozac, recommend follow-up in outpatient setting       Diet: NPO per Anesthesia Guidelines for Procedure/Surgery Except for: Meds    DVT Prophylaxis:  "Pneumatic Compression Devices  Rodriguez Catheter: Not present  Fluids: NPO for now, will need fluids in AM if surgery delayed  Lines: None     Cardiac Monitoring: None  Code Status: Full Code      Clinically Significant Risk Factors Present on Admission        # Overweight: Estimated body mass index is 26.27 kg/m  as calculated from the following:    Height as of this encounter: 1.499 m (4' 11\").    Weight as of this encounter: 59 kg (130 lb 1.1 oz).       # Asthma: noted on problem list        Disposition Plan      Expected Discharge Date: 11/09/2024                This patient will be formally staffed at AM rounds.      Linda Maldonado MD  Hospitalist Service  [facility]  Securely message with [employer] (more info)  Text page via [facility] Paging/Directory   ______________________________________________________________________    Chief Complaint   Abdominal pain    History is obtained from the patient    History of Present Illness   Piero Villarreal is a 34 year old female who has a history of asthma and anxiety presenting for about 6 hours of abdominal pain.    Patient was eating dinner when she had sudden onset abdominal pain in RUQ with radiation to the mid abdomen and back. Did have a similar sensation day before admission, but episode was mild and not prolonged. This episode started after an early dinner and worsened with radiation to the back. No fevers, chills, nausea, vomiting, or reflux symptoms. No abnormal Bms lately. No other symptoms reported.    Works from home. No alcohol or smoking endorsed.      Past Medical History    Past Medical History:   Diagnosis Date    Asthma     uses inhaler prn    Bacterial vaginosis November 2013    Current moderate episode of major depressive disorder, unspecified whether recurrent (H) 4/13/2023    Diabetes mellitus (H)     gestational diabetes, diet controlled diet controlled    Liver mass, right lobe     6 mm, seen on US on 12/13, likely hemangioma    " Monochorionic diamniotic twin gestation     cardiac abnormalities in Twin A, chest mass in Twin B,  at 21 weeks    Pelvic pain     Uncomplicated asthma     pt only gets flare ups when ill       Past Surgical History   Past Surgical History:   Procedure Laterality Date    DILATION AND CURETTAGE      NO HISTORY OF SURGERY         Prior to Admission Medications   Prior to Admission Medications   Prescriptions Last Dose Informant Patient Reported? Taking?   FLUoxetine (PROZAC) 10 MG capsule Unknown  No Yes   Sig: Take 1 capsule (10 mg) by mouth daily.   albuterol (PROAIR HFA/PROVENTIL HFA/VENTOLIN HFA) 108 (90 Base) MCG/ACT inhaler Past Week  No Yes   Sig: Inhale 2 puffs into the lungs every 4 hours as needed for shortness of breath or wheezing.   hydrOXYzine HCl (ATARAX) 25 MG tablet Past Month  No Yes   Sig: Take 1 tablet (25 mg) by mouth 3 times daily as needed for anxiety      Facility-Administered Medications: None        Review of Systems    The 10 point Review of Systems is negative other than noted in the HPI or here.      Physical Exam   Vital Signs: Temp: 98.7  F (37.1  C) Temp src: Oral BP: (!) 148/87 Pulse: 62   Resp: 18 SpO2: 100 %      Weight: 130 lbs 1.14 oz    General: Alert and oriented x 3, no acute distress  Skin: clean, dry, and intact  HEENT: normocephalic, atraumatic, PERRL, EOMI, no conjunctival injection or icterus, ears and nose normal, no LAD or masses  Resp: clear to ausculation bilaterally, no rales or wheezes  Cardio: RRR, S1 and S2 present, no S3 or S4, no rubs or murmurs  Abdomen: soft, TTP RUQ, mildly TTP in epigastric region, nondistended, bowel sounds present x 4, no masses  Extremities: no erythema or edema  Psych: normal mood, normal mentation      Medical Decision Making   Please see A&P for additional details of medical decision making.      Data   ------------------------- PAST 24 HR DATA REVIEWED -----------------------------------------------    I have personally  reviewed the following data over the past 24 hrs:    11.2 (H)  \   13.8   / 202     140 105 8.5 /  114 (H)   4.2 23 0.72 \     ALT: 18 AST: 17 AP: 56 TBILI: 0.3   ALB: 4.7 TOT PROTEIN: 7.8 LIPASE: 35       Imaging results reviewed over the past 24 hrs:   Recent Results (from the past 24 hours)   US Abdomen Limited    Narrative    EXAM: US ABDOMEN LIMITED  LOCATION: [facility]  DATE: 11/7/2024    INDICATION: RUQ tenderness, pain after eating meals.  COMPARISON: None.  TECHNIQUE: Limited abdominal ultrasound.    FINDINGS:    GALLBLADDER: Cholelithiasis and gallbladder wall thickening, 10 mm. The gallbladder is slightly distended. Indeterminate sonographic Cano's sign due to pain medication.    BILE DUCTS: Prominent common bile duct 7 mm.    LIVER: Grossly within normal limits where seen.    RIGHT KIDNEY: No hydronephrosis.    PANCREAS: Partial observation by bowel gas.    No visible ascites.      Impression    IMPRESSION:  1.  Cholelithiasis and gallbladder wall thickening. Indeterminate sonographic Cano's sign due to pain medication. Correlate for acute cholecystitis.  2.  CBD 7 mm.

## 2024-11-07 NOTE — CONSULTS
General Surgery Consultation  Piero Villarreal MRN# [id number]   Age/Sex: 34 year old female YOB: 1990     Reason for consult: 1. Acute cholecystitis            Requesting physician: [facility] emergency department                   Assessment and Plan:   Assessment:  Biliary colic versus acute cholecystitis, favoring the latter given the gallbladder wall thickening noted on ultrasound imaging.  Discussed with the patient the rationale for pursuing laparoscopic cholecystectomy, risks and benefits of surgery, and her dissipated perioperative course.  She wishes to proceed.    Plan:  Laparoscopic cholecystectomy          Chief Complaint:     Chief Complaint   Patient presents with    Abdominal Pain    Flank Pain        History is obtained from the patient    HPI:   Piero Villarreal is a 34 year old female who presents through the [facility] emergency department with right upper quadrant abdominal pain that began yesterday evening after dinner.  She reports that she has had this occur previously, but symptoms were worse last night and did not resolve as they have previously.  On initial presentation to the emergency department cholecystitis was suspected with ultrasound imaging obtained confirming this diagnosis.  He has a reassuring standpoint of choledocholithiasis.  This morning on my exam she notes diminished to absent right upper quadrant pain.          Past Medical History:     Past Medical History:   Diagnosis Date    Asthma     uses inhaler prn    Bacterial vaginosis 2013    Current moderate episode of major depressive disorder, unspecified whether recurrent (H) 2023    Diabetes mellitus (H)     gestational diabetes, diet controlled diet controlled    Liver mass, right lobe     6 mm, seen on US on , likely hemangioma    Monochorionic diamniotic twin gestation     cardiac abnormalities in Twin A, chest mass in Twin B,  at 21 weeks    Pelvic pain     PONV (postoperative nausea and  vomiting)     Uncomplicated asthma     pt only gets flare ups when ill              Past Surgical History:     Past Surgical History:   Procedure Laterality Date    DILATION AND CURETTAGE  2012    NO HISTORY OF SURGERY               Social History:    reports that she has been smoking cigarettes. She has never been exposed to tobacco smoke. She has never used smokeless tobacco. She reports that she does not currently use alcohol. She reports that she does not use drugs.           Family History:     Family History   Problem Relation Age of Onset    Asthma Son     Diabetes No family hx of     Coronary Artery Disease No family hx of     Hypertension No family hx of     Breast Cancer No family hx of     Cancer - colorectal No family hx of     Ovarian Cancer No family hx of     Prostate Cancer No family hx of     Other Cancer No family hx of     Mental Illness No family hx of     Depression/Anxiety No family hx of     Cerebrovascular Disease No family hx of     Thyroid Disease No family hx of     Obesity No family hx of     Thyroid Disease No family hx of     Genetic Disorder No family hx of     Osteoporosis No family hx of     Anesthesia Reaction No family hx of     Substance Abuse No family hx of     Mental Illness No family hx of     Anxiety Disorder No family hx of     Depression No family hx of     Colon Cancer No family hx of     Hyperlipidemia No family hx of               Allergies:     Allergies   Allergen Reactions    Cats               Medications:     Prior to Admission medications    Medication Sig Start Date End Date Taking? Authorizing Provider   albuterol (PROAIR HFA/PROVENTIL HFA/VENTOLIN HFA) 108 (90 Base) MCG/ACT inhaler Inhale 2 puffs into the lungs every 4 hours as needed for shortness of breath or wheezing. 10/31/24  Yes Akira Mendez MD   FLUoxetine (PROZAC) 10 MG capsule Take 1 capsule (10 mg) by mouth daily. 8/23/24  Yes Tanja Blanton MD   hydrOXYzine HCl (ATARAX) 25 MG tablet Take  "1 tablet (25 mg) by mouth 3 times daily as needed for anxiety 5/2/24  Yes Akira Mendez MD              Review of Systems:   The Review of Systems is negative other than noted in the HPI            Physical Exam:   Patient Vitals for the past 24 hrs:   BP Temp Temp src Pulse Resp SpO2 Height Weight   11/07/24 0735 105/77 98.3  F (36.8  C) Oral 83 16 99 % -- --   11/07/24 0554 119/78 97.9  F (36.6  C) Oral 67 16 100 % -- --   11/07/24 0501 108/67 -- -- 54 -- 98 % -- --   11/07/24 0300 98/66 -- -- 57 -- 97 % -- --   11/07/24 0200 118/75 97.7  F (36.5  C) Oral 64 -- 98 % -- --   11/07/24 0100 130/74 -- -- 65 -- 99 % -- --   11/06/24 2300 (!) 148/87 -- -- 62 -- 100 % -- --   11/06/24 2017 -- -- -- -- -- -- -- 59 kg (130 lb 1.1 oz)   11/06/24 2016 (!) 166/83 -- -- -- -- -- 1.499 m (4' 11\") --   11/06/24 2013 -- 98.7  F (37.1  C) Oral 65 18 100 % -- --          Intake/Output Summary (Last 24 hours) at 11/7/2024 0754  Last data filed at 11/7/2024 0151  Gross per 24 hour   Intake 100 ml   Output --   Net 100 ml      Constitutional:   awake, alert, cooperative, no apparent distress, and appears stated age       Eyes:   PERRL, conjunctiva/corneas clear, EOM's intact; no scleral edema or icterus noted        ENT:   Normocephalic, without obvious abnormality, atraumatic, Lips, mucosa, and tongue normal        Lungs:   Normal respiratory effort, no accessory muscle use       Cardiovascular:   Regular rate and rhythm       Abdomen:   Soft, nondistended.  Only minimal residual tenderness in the right upper quadrant       Musculoskeletal:   No obvious swelling, bruising or deformity       Skin:   Skin color and texture normal for patient, no rashes or lesions              Data:         All imaging studies reviewed by me.    Results for orders placed or performed during the hospital encounter of 11/06/24 (from the past 24 hours)   CBC with platelets differential    Narrative    The following orders were created for panel order CBC " with platelets differential.  Procedure                               Abnormality         Status                     ---------                               -----------         ------                     CBC with platelets and d...[[id number]]  Abnormal            Final result                 Please view results for these tests on the individual orders.   Comprehensive metabolic panel   Result Value Ref Range    Sodium 140 135 - 145 mmol/L    Potassium 4.2 3.4 - 5.3 mmol/L    Carbon Dioxide (CO2) 23 22 - 29 mmol/L    Anion Gap 12 7 - 15 mmol/L    Urea Nitrogen 8.5 6.0 - 20.0 mg/dL    Creatinine 0.72 0.51 - 0.95 mg/dL    GFR Estimate >90 >60 mL/min/1.73m2    Calcium 9.4 8.8 - 10.4 mg/dL    Chloride 105 98 - 107 mmol/L    Glucose 114 (H) 70 - 99 mg/dL    Alkaline Phosphatase 56 40 - 150 U/L    AST 17 0 - 45 U/L    ALT 18 0 - 50 U/L    Protein Total 7.8 6.4 - 8.3 g/dL    Albumin 4.7 3.5 - 5.2 g/dL    Bilirubin Total 0.3 <=1.2 mg/dL   Lipase   Result Value Ref Range    Lipase 35 13 - 60 U/L   CBC with platelets and differential   Result Value Ref Range    WBC Count 11.2 (H) 4.0 - 11.0 10e3/uL    RBC Count 4.46 3.80 - 5.20 10e6/uL    Hemoglobin 13.8 11.7 - 15.7 g/dL    Hematocrit 41.7 35.0 - 47.0 %    MCV 94 78 - 100 fL    MCH 30.9 26.5 - 33.0 pg    MCHC 33.1 31.5 - 36.5 g/dL    RDW 13.3 10.0 - 15.0 %    Platelet Count 202 150 - 450 10e3/uL    % Neutrophils 64 %    % Lymphocytes 25 %    % Monocytes 8 %    % Eosinophils 2 %    % Basophils 1 %    % Immature Granulocytes 0 %    NRBCs per 100 WBC 0 <1 /100    Absolute Neutrophils 7.1 1.6 - 8.3 10e3/uL    Absolute Lymphocytes 2.8 0.8 - 5.3 10e3/uL    Absolute Monocytes 0.9 0.0 - 1.3 10e3/uL    Absolute Eosinophils 0.3 0.0 - 0.7 10e3/uL    Absolute Basophils 0.1 0.0 - 0.2 10e3/uL    Absolute Immature Granulocytes 0.1 <=0.4 10e3/uL    Absolute NRBCs 0.0 10e3/uL   UA with Microscopic reflex to Culture    Specimen: Urine, Catheter   Result Value Ref Range    Color Urine Yellow  Colorless, Straw, Light Yellow, Yellow    Appearance Urine Clear Clear    Glucose Urine Negative Negative mg/dL    Bilirubin Urine Negative Negative    Ketones Urine Negative Negative mg/dL    Specific Gravity Urine 1.020 1.001 - 1.030    Blood Urine Negative Negative    pH Urine 6.5 5.0 - 7.0    Protein Albumin Urine Negative Negative mg/dL    Urobilinogen Urine <2.0 <2.0 mg/dL    Nitrite Urine Negative Negative    Leukocyte Esterase Urine Negative Negative    Mucus Urine Present (A) None Seen /LPF    RBC Urine 0 <=2 /HPF    WBC Urine <1 <=5 /HPF    Squamous Epithelials Urine 1 <=1 /HPF    Narrative    Urine Culture not indicated   US Abdomen Limited    Narrative    EXAM: US ABDOMEN LIMITED  LOCATION: [facility]  DATE: 11/7/2024    INDICATION: RUQ tenderness, pain after eating meals.  COMPARISON: None.  TECHNIQUE: Limited abdominal ultrasound.    FINDINGS:    GALLBLADDER: Cholelithiasis and gallbladder wall thickening, 10 mm. The gallbladder is slightly distended. Indeterminate sonographic Cano's sign due to pain medication.    BILE DUCTS: Prominent common bile duct 7 mm.    LIVER: Grossly within normal limits where seen.    RIGHT KIDNEY: No hydronephrosis.    PANCREAS: Partial observation by bowel gas.    No visible ascites.      Impression    IMPRESSION:  1.  Cholelithiasis and gallbladder wall thickening. Indeterminate sonographic Cano's sign due to pain medication. Correlate for acute cholecystitis.  2.  CBD 7 mm.       Basic metabolic panel   Result Value Ref Range    Sodium 141 135 - 145 mmol/L    Potassium 3.5 3.4 - 5.3 mmol/L    Chloride 108 (H) 98 - 107 mmol/L    Carbon Dioxide (CO2) 24 22 - 29 mmol/L    Anion Gap 9 7 - 15 mmol/L    Urea Nitrogen 6.8 6.0 - 20.0 mg/dL    Creatinine 0.62 0.51 - 0.95 mg/dL    GFR Estimate >90 >60 mL/min/1.73m2    Calcium 8.2 (L) 8.8 - 10.4 mg/dL    Glucose 95 70 - 99 mg/dL   CBC with platelets   Result Value Ref Range    WBC Count 9.7 4.0 - 11.0  10e3/uL    RBC Count 3.61 (L) 3.80 - 5.20 10e6/uL    Hemoglobin 11.0 (L) 11.7 - 15.7 g/dL    Hematocrit 34.0 (L) 35.0 - 47.0 %    MCV 94 78 - 100 fL    MCH 30.5 26.5 - 33.0 pg    MCHC 32.4 31.5 - 36.5 g/dL    RDW 13.2 10.0 - 15.0 %    Platelet Count 151 150 - 450 10e3/uL        Jaya Villegas MD

## 2024-11-07 NOTE — ANESTHESIA POSTPROCEDURE EVALUATION
Patient: Piero Villarreal    Procedure: Procedure(s):  CHOLECYSTECTOMY, LAPAROSCOPIC       Anesthesia Type:  General    Note:  Disposition: Outpatient   Postop Pain Control: Uneventful            Sign Out: Well controlled pain   PONV: No   Neuro/Psych: Uneventful            Sign Out: Acceptable/Baseline neuro status   Airway/Respiratory: Uneventful            Sign Out: Acceptable/Baseline resp. status   CV/Hemodynamics: Uneventful            Sign Out: Acceptable CV status; No obvious hypovolemia; No obvious fluid overload   Other NRE: NONE   DID A NON-ROUTINE EVENT OCCUR? No           Last vitals:  Vitals Value Taken Time   BP 91/53 11/07/24 1110   Temp 36.6  C (97.9  F) 11/07/24 1110   Pulse 67 11/07/24 1110   Resp 12 11/07/24 1110   SpO2 96 % 11/07/24 1110       Electronically Signed By: Hannah Gao MD  November 7, 2024  4:20 PM

## 2024-11-07 NOTE — ANESTHESIA CARE TRANSFER NOTE
Patient: Piero Villarreal    Procedure: Procedure(s):  CHOLECYSTECTOMY, LAPAROSCOPIC       Diagnosis: Acute cholecystitis [K81.0]  Diagnosis Additional Information: No value filed.    Anesthesia Type:   General     Note:    Oropharynx: oropharynx clear of all foreign objects  Level of Consciousness: awake  Oxygen Supplementation: face mask  Level of Supplemental Oxygen (L/min / FiO2): 8  Independent Airway: airway patency satisfactory and stable  Dentition: dentition unchanged  Vital Signs Stable: post-procedure vital signs reviewed and stable  Report to RN Given: handoff report given  Patient transferred to: PACU    Handoff Report: Identifed the Patient, Identified the Reponsible Provider, Reviewed the pertinent medical history, Discussed the surgical course, Reviewed Intra-OP anesthesia mangement and issues during anesthesia, Set expectations for post-procedure period and Allowed opportunity for questions and acknowledgement of understanding      Vitals:  Vitals Value Taken Time   /69 11/07/24 0945   Temp 36.5  C (97.7  F) 11/07/24 0946   Pulse 71 11/07/24 0946   Resp 18 11/07/24 0946   SpO2 100 % 11/07/24 0946   Vitals shown include unfiled device data.    Electronically Signed By: ANDRIA Martinez CRNA  November 7, 2024  9:48 AM

## 2024-11-08 ENCOUNTER — TELEPHONE (OUTPATIENT)
Dept: SURGERY | Facility: CLINIC | Age: 34
End: 2024-11-08
Payer: COMMERCIAL

## 2024-11-08 LAB
ATRIAL RATE - MUSE: 56 BPM
DIASTOLIC BLOOD PRESSURE - MUSE: 75 MMHG
INTERPRETATION ECG - MUSE: NORMAL
P AXIS - MUSE: 77 DEGREES
PR INTERVAL - MUSE: 150 MS
QRS DURATION - MUSE: 94 MS
QT - MUSE: 440 MS
QTC - MUSE: 424 MS
R AXIS - MUSE: 76 DEGREES
SYSTOLIC BLOOD PRESSURE - MUSE: 118 MMHG
T AXIS - MUSE: 60 DEGREES
VENTRICULAR RATE- MUSE: 56 BPM

## 2024-11-08 NOTE — TELEPHONE ENCOUNTER
Patient s/p laparoscopic cholecystectomy 11/7 calling for a letter to give to her employer. Letter added and sent to her via [employer].    Asia Ladd RN  [facility]  General Surgery  [facility] [address]  [email or web address]  Office:[phone]  Employed by [employer]

## 2024-11-08 NOTE — LETTER
November 8, 2024      Piero Villarreal  [address] [place] [address]        To Whom It May Concern:    Piero Villarreal was under my care for her recent surgery on 11/7/24. She may return to work on 11/25 without restrictions. Please reach out with any additional questions or concerns.      Sincerely,      Jaya Villegas MD

## 2024-11-09 DIAGNOSIS — K81.0 ACUTE CHOLECYSTITIS: ICD-10-CM

## 2024-11-11 ENCOUNTER — DOCUMENTATION ONLY (OUTPATIENT)
Dept: SURGERY | Facility: CLINIC | Age: 34
End: 2024-11-11
Payer: COMMERCIAL

## 2024-11-11 ENCOUNTER — TELEPHONE (OUTPATIENT)
Dept: SURGERY | Facility: CLINIC | Age: 34
End: 2024-11-11
Payer: COMMERCIAL

## 2024-11-11 LAB
PATH REPORT.COMMENTS IMP SPEC: NORMAL
PATH REPORT.COMMENTS IMP SPEC: NORMAL
PATH REPORT.FINAL DX SPEC: NORMAL
PATH REPORT.GROSS SPEC: NORMAL
PATH REPORT.MICROSCOPIC SPEC OTHER STN: NORMAL
PATH REPORT.RELEVANT HX SPEC: NORMAL
PHOTO IMAGE: NORMAL

## 2024-11-11 RX ORDER — TRAMADOL HYDROCHLORIDE 50 MG/1
50 TABLET ORAL EVERY 6 HOURS PRN
Qty: 10 TABLET | Refills: 0 | Status: SHIPPED | OUTPATIENT
Start: 2024-11-11

## 2024-11-11 NOTE — TELEPHONE ENCOUNTER
[facility] Post-Op Phone Call                     Surgeon: Jaya Villegas    Date of Surgery: 11/7/24  Surgery: Laparoscopic Cholecystectomy  Discharge Date: 11/7/24    Date/Time Called:   Date: 11/11/2024 Time: 11:58 AM   Attempt: First    Pain Control:  Intensity: Mild (1 - 3)  Duration/Location/Explain: incisional  What makes it better/worse?     Medications:  Narcotic Use - Yes  Drug type: Tramadol, requesting a refill  Frequency: PRN  Talked about including tylenol and ibuprofen into pain regimen as well.     Incisions:  Drainage? clean and dry  Any fever type symptoms? No  Comment:     GI:  Nausea? No  Vomiting? No  BM? Yes  Gas? Yes  Voiding Frequency? 4 or more/day   Appetite? Good    Activity:  Walking activity? Yes  Frequency/Type: as tolerated, advised to include activity to help with bloating and gas from surgery  Restrictions: as tolerated    Return to Work Plans?  Expected date 11/21/24  Do you need anything from us in this regard? Yes , [facility] paperwork being emailed to [facility] for processing.    Post-op appointment made? No          Thank you for your time. Please do not hesitate to call us with any questions or concerns.    Call completed by: Asia Ladd RN

## 2024-11-11 NOTE — PROGRESS NOTES
Pt emailed me back requesting RTW date as 11/25/24, stating [employer] wrote a letter with this return to work date as well.     Paperowrk updated and re faxed/re emailed.

## 2024-11-11 NOTE — PROGRESS NOTES
Received pts FMLA paperwork via email.   Completed and faxed to [facility] #[phone] and emailed to pt per request.     RTW 11/21/24.     Placed in my FMLA file in case changes are needed.

## 2024-11-25 ENCOUNTER — MYC REFILL (OUTPATIENT)
Dept: SURGERY | Facility: CLINIC | Age: 34
End: 2024-11-25
Payer: COMMERCIAL

## 2024-11-25 DIAGNOSIS — K81.0 ACUTE CHOLECYSTITIS: ICD-10-CM

## 2024-11-26 RX ORDER — TRAMADOL HYDROCHLORIDE 50 MG/1
50 TABLET ORAL EVERY 6 HOURS PRN
Qty: 10 TABLET | Refills: 0 | OUTPATIENT
Start: 2024-11-26

## 2024-11-26 NOTE — TELEPHONE ENCOUNTER
Patient is s/p lap cholecystectomy on 11/7. Patient already had a refill on 11/11 for Tramadol for continued pain. She reports still having some incisional pain now, and is using ibuprofen PRN. RN encouraged patient to focus on alternating scheduled Tylenol and Ibuprofen and using ice packs PRN for the rest of the week to see if that helps manage her pain better. She is invited to call the clinic on Friday for an update on her pain.     Abby FAYE RN, BSN    [facility]  General Surgery  [address] [place] [id number]  Office: [phone]  Employed by [employer]

## 2024-12-03 ENCOUNTER — PATIENT OUTREACH (OUTPATIENT)
Dept: FAMILY MEDICINE | Facility: CLINIC | Age: 34
End: 2024-12-03
Payer: COMMERCIAL

## 2024-12-03 NOTE — TELEPHONE ENCOUNTER
Patient Quality Outreach    Patient is due for the following:   Asthma  -  Asthma follow-up visit    Action(s) Taken:   Call patient but patient not available.     Type of outreach:    Phone, left message for patient/parent to call back.    Questions for provider review:    None           Paw MooBHAVYA  Chart routed to Care Team.

## 2025-01-17 ASSESSMENT — ASTHMA QUESTIONNAIRES
QUESTION_2 LAST FOUR WEEKS HOW OFTEN HAVE YOU HAD SHORTNESS OF BREATH: ONCE OR TWICE A WEEK
QUESTION_3 LAST FOUR WEEKS HOW OFTEN DID YOUR ASTHMA SYMPTOMS (WHEEZING, COUGHING, SHORTNESS OF BREATH, CHEST TIGHTNESS OR PAIN) WAKE YOU UP AT NIGHT OR EARLIER THAN USUAL IN THE MORNING: ONCE A WEEK
QUESTION_4 LAST FOUR WEEKS HOW OFTEN HAVE YOU USED YOUR RESCUE INHALER OR NEBULIZER MEDICATION (SUCH AS ALBUTEROL): TWO OR THREE TIMES PER WEEK
QUESTION_5 LAST FOUR WEEKS HOW WOULD YOU RATE YOUR ASTHMA CONTROL: SOMEWHAT CONTROLLED
QUESTION_1 LAST FOUR WEEKS HOW MUCH OF THE TIME DID YOUR ASTHMA KEEP YOU FROM GETTING AS MUCH DONE AT WORK, SCHOOL OR AT HOME: A LITTLE OF THE TIME
ACT_TOTALSCORE: 17
ACT_TOTALSCORE: 17

## 2025-01-22 ENCOUNTER — VIRTUAL VISIT (OUTPATIENT)
Dept: FAMILY MEDICINE | Facility: CLINIC | Age: 35
End: 2025-01-22
Payer: COMMERCIAL

## 2025-01-22 DIAGNOSIS — F17.200 TOBACCO USE DISORDER: ICD-10-CM

## 2025-01-22 DIAGNOSIS — F41.0 PANIC DISORDER: ICD-10-CM

## 2025-01-22 DIAGNOSIS — F32.1 CURRENT MODERATE EPISODE OF MAJOR DEPRESSIVE DISORDER, UNSPECIFIED WHETHER RECURRENT (H): ICD-10-CM

## 2025-01-22 DIAGNOSIS — J45.30 MILD PERSISTENT ASTHMA WITHOUT COMPLICATION: Primary | ICD-10-CM

## 2025-01-22 RX ORDER — FLUOXETINE 10 MG/1
10 CAPSULE ORAL DAILY
Qty: 90 CAPSULE | Refills: 3 | Status: SHIPPED | OUTPATIENT
Start: 2025-01-22

## 2025-01-22 RX ORDER — BUDESONIDE AND FORMOTEROL FUMARATE DIHYDRATE 80; 4.5 UG/1; UG/1
AEROSOL RESPIRATORY (INHALATION)
Qty: 20.4 G | Refills: 11 | Status: SHIPPED | OUTPATIENT
Start: 2025-01-22

## 2025-01-22 NOTE — PROGRESS NOTES
"Family Medicine Video Visit Note  Piero is being evaluated via a billable video visit.             Video Visit Consent     Patient was verbally read the following and verbal consent was obtained.  \"Video visits are billed at different rates depending on your insurance coverage. During this emergency period, for some insurers they may be billed the same as an in-person visit.  Please reach out to your insurance provider with any questions.  If during the course of the call the physician/provider feels a video visit is not appropriate, you will not be charged for this service.\"     (Name person giving consent:  Patient   Date verbal consent given:  1/22/2025  Time verbal consent given:  2:24 PM)    Patient would like the video invitation sent by: Text to cell phone: [phone]      Chief Complaint   Patient presents with    follow up asthma and meds             HPI     Video Start Time: 2:25 PM    Piero presents to clinic today for the following health issues:    Doesn't recall doing PFTs in past.   Using inhalers a few years now.   Triggers: cold weather, dust, pollen, cats, illness.   ACT 17 on 1/17/25. Improved now that over illness.   Has prn albuterol inhaler and nebs. 1-2x weekly when not sick.  Just got over sickness, had to use almost everyday morning or night only.   Smokes tobacco here and there. Pack usually lasts a week. Puff or two and will toss it. Smoking less than previously. Thinks its a little bit of craving and a little bit of habit.  Thinks Prozac helping with mood so that helps. Needing refill.      Current Outpatient Medications   Medication Sig Dispense Refill    budesonide-formoterol (SYMBICORT) 80-4.5 MCG/ACT Inhaler Inhale 1-2 puffs as needed. May use up to 12 puffs per day. 20.4 g 11    FLUoxetine (PROZAC) 10 MG capsule Take 1 capsule (10 mg) by mouth daily. 90 capsule 3    nicotine (NICORETTE) 2 MG gum Place 1 each (2 mg) inside cheek every hour as needed for nicotine withdrawal symptoms. 200 " "each 2    hydrOXYzine HCl (ATARAX) 25 MG tablet Take 1 tablet (25 mg) by mouth 3 times daily as needed for anxiety 90 tablet 0     Allergies   Allergen Reactions    Cats               Review of Systems:     Constitutional, HEENT, cardiovascular, pulmonary, gi and gu systems are negative, except as otherwise noted.         Physical Exam:     [facility] 01/20/2025   Estimated body mass index is 26.27 kg/m  as calculated from the following:    Height as of 11/6/24: 1.499 m (4' 11\").    Weight as of 11/6/24: 59 kg (130 lb 1.1 oz).    GENERAL: alert and no distress  EYES: Eyes grossly normal to inspection.  No discharge or erythema, or obvious scleral/conjunctival abnormalities.  RESP: No audible wheeze, cough, or visible cyanosis.    SKIN: Visible skin clear. No significant rash, abnormal pigmentation or lesions.  NEURO: Cranial nerves grossly intact.  Mentation and speech appropriate for age.  PSYCH: Appropriate affect, tone, and pace of words          Assessment and Plan       ICD-10-CM    1. Mild persistent asthma without complication  J45.30 budesonide-formoterol (SYMBICORT) 80-4.5 MCG/ACT Inhaler     General PFT Lab (Please always keep checked)     Pulmonary Function Test      2. Tobacco use disorder  F17.200 nicotine (NICORETTE) 2 MG gum      3. Current moderate episode of major depressive disorder, unspecified whether recurrent (H)  F32.1 FLUoxetine (PROZAC) 10 MG capsule      4. Panic disorder  F41.0 FLUoxetine (PROZAC) 10 MG capsule          Asthma follow up. ACT score 17 on 1/17/25 though would expect this to be better now that patient over viral illness. At baseline, uses albuterol 1-2x weekly but when triggered, needs to use 1-2x daily. Has never done PFTs for formal diagnosis, ordered today. For now, will transition patient to prn SMART therapy. Continues to smoke, open to quitting, interested in NRT. Feels that mental health symptoms well controlled on Prozac, needing refill.      After Visit " Information:  Patient chose to view AVS via [employer]      Return in about 4 weeks (around 2/19/2025) for Follow up.      Video-Visit Details    Type of service:  Video Visit    Video End Time (time video stopped): 2:40 PM    Originating Location (pt. Location): Home    Distant Location (provider location):  [facility]     Platform used for Video Visit: Pradeep Mendez MD  I precepted today with Jan Solo MD

## 2025-01-30 NOTE — PROGRESS NOTES
Preceptor Attestation:   Patient seen, evaluated and discussed with the resident. I have verified the content of the note, which accurately reflects my assessment of the patient and the plan of care.    Supervising Physician:Jan Solo MD    [facility]

## 2025-02-13 ENCOUNTER — MYC MEDICAL ADVICE (OUTPATIENT)
Dept: FAMILY MEDICINE | Facility: CLINIC | Age: 35
End: 2025-02-13
Payer: COMMERCIAL

## 2025-02-13 DIAGNOSIS — J45.30 MILD PERSISTENT ASTHMA WITHOUT COMPLICATION: Primary | ICD-10-CM

## 2025-02-13 RX ORDER — ALBUTEROL SULFATE 90 UG/1
2 INHALANT RESPIRATORY (INHALATION) 4 TIMES DAILY
Qty: 17 G | Refills: 2 | Status: SHIPPED | OUTPATIENT
Start: 2025-02-13

## 2025-02-13 NOTE — TELEPHONE ENCOUNTER
Patient transitioned from using albuterol prn rescue inhaler alone to SMART therapy with Symbicort last month.     Received message from her that she feels this has increased her frequency of panic attacks. Wanting to transition back to just her albuterol.    Sent prn albuterol inhaler to pharmacy.       Akira Mendez MD

## 2025-03-04 ENCOUNTER — MYC MEDICAL ADVICE (OUTPATIENT)
Dept: FAMILY MEDICINE | Facility: CLINIC | Age: 35
End: 2025-03-04
Payer: COMMERCIAL

## 2025-07-12 ENCOUNTER — HEALTH MAINTENANCE LETTER (OUTPATIENT)
Age: 35
End: 2025-07-12

## 2025-07-30 ENCOUNTER — MYC MEDICAL ADVICE (OUTPATIENT)
Dept: FAMILY MEDICINE | Facility: CLINIC | Age: 35
End: 2025-07-30

## 2025-07-30 ENCOUNTER — E-VISIT (OUTPATIENT)
Dept: FAMILY MEDICINE | Facility: CLINIC | Age: 35
End: 2025-07-30
Payer: COMMERCIAL

## 2025-07-30 DIAGNOSIS — Z02.89 ENCOUNTER FOR COMPLETION OF FORM WITH PATIENT: Primary | ICD-10-CM

## 2025-07-30 ASSESSMENT — ASTHMA QUESTIONNAIRES: ACT_TOTALSCORE: 16

## 2025-07-31 NOTE — TELEPHONE ENCOUNTER
Form completed by Dr. Ortega at the [facility] during E-Visit with patient yesterday 7/30/25. Form faxed to Jordon at [phone]. Patient has been informed on [employer] and copy sent to patient on [employer] as well. Form sent to HIM for scanning, completing task.  Kirby Huggins

## 2025-08-06 ENCOUNTER — E-VISIT (OUTPATIENT)
Dept: FAMILY MEDICINE | Facility: CLINIC | Age: 35
End: 2025-08-06
Payer: COMMERCIAL

## 2025-08-06 DIAGNOSIS — F41.0 PANIC DISORDER: Primary | ICD-10-CM

## 2025-08-06 DIAGNOSIS — F32.1 CURRENT MODERATE EPISODE OF MAJOR DEPRESSIVE DISORDER, UNSPECIFIED WHETHER RECURRENT (H): ICD-10-CM

## 2025-08-06 ASSESSMENT — ANXIETY QUESTIONNAIRES
3. WORRYING TOO MUCH ABOUT DIFFERENT THINGS: MORE THAN HALF THE DAYS
1. FEELING NERVOUS, ANXIOUS, OR ON EDGE: MORE THAN HALF THE DAYS
8. IF YOU CHECKED OFF ANY PROBLEMS, HOW DIFFICULT HAVE THESE MADE IT FOR YOU TO DO YOUR WORK, TAKE CARE OF THINGS AT HOME, OR GET ALONG WITH OTHER PEOPLE?: VERY DIFFICULT
GAD7 TOTAL SCORE: 14
5. BEING SO RESTLESS THAT IT IS HARD TO SIT STILL: MORE THAN HALF THE DAYS
GAD7 TOTAL SCORE: 14
6. BECOMING EASILY ANNOYED OR IRRITABLE: MORE THAN HALF THE DAYS
2. NOT BEING ABLE TO STOP OR CONTROL WORRYING: MORE THAN HALF THE DAYS
7. FEELING AFRAID AS IF SOMETHING AWFUL MIGHT HAPPEN: MORE THAN HALF THE DAYS
7. FEELING AFRAID AS IF SOMETHING AWFUL MIGHT HAPPEN: MORE THAN HALF THE DAYS
IF YOU CHECKED OFF ANY PROBLEMS ON THIS QUESTIONNAIRE, HOW DIFFICULT HAVE THESE PROBLEMS MADE IT FOR YOU TO DO YOUR WORK, TAKE CARE OF THINGS AT HOME, OR GET ALONG WITH OTHER PEOPLE: VERY DIFFICULT
4. TROUBLE RELAXING: MORE THAN HALF THE DAYS
GAD7 TOTAL SCORE: 14

## 2025-08-06 ASSESSMENT — PATIENT HEALTH QUESTIONNAIRE - PHQ9
SUM OF ALL RESPONSES TO PHQ QUESTIONS 1-9: 15
10. IF YOU CHECKED OFF ANY PROBLEMS, HOW DIFFICULT HAVE THESE PROBLEMS MADE IT FOR YOU TO DO YOUR WORK, TAKE CARE OF THINGS AT HOME, OR GET ALONG WITH OTHER PEOPLE: VERY DIFFICULT
SUM OF ALL RESPONSES TO PHQ QUESTIONS 1-9: 15

## 2025-08-11 ASSESSMENT — PATIENT HEALTH QUESTIONNAIRE - PHQ9
SUM OF ALL RESPONSES TO PHQ QUESTIONS 1-9: 16
10. IF YOU CHECKED OFF ANY PROBLEMS, HOW DIFFICULT HAVE THESE PROBLEMS MADE IT FOR YOU TO DO YOUR WORK, TAKE CARE OF THINGS AT HOME, OR GET ALONG WITH OTHER PEOPLE: VERY DIFFICULT
SUM OF ALL RESPONSES TO PHQ QUESTIONS 1-9: 16

## 2025-08-12 ENCOUNTER — VIRTUAL VISIT (OUTPATIENT)
Dept: BEHAVIORAL HEALTH | Facility: CLINIC | Age: 35
End: 2025-08-12
Payer: COMMERCIAL

## 2025-08-12 DIAGNOSIS — F32.1 CURRENT MODERATE EPISODE OF MAJOR DEPRESSIVE DISORDER, UNSPECIFIED WHETHER RECURRENT (H): ICD-10-CM

## 2025-08-12 DIAGNOSIS — F41.0 PANIC DISORDER: ICD-10-CM

## 2025-08-12 DIAGNOSIS — F43.23 ADJUSTMENT DISORDER WITH MIXED ANXIETY AND DEPRESSED MOOD: Primary | ICD-10-CM

## 2025-08-12 ASSESSMENT — ANXIETY QUESTIONNAIRES
GAD7 TOTAL SCORE: 16
1. FEELING NERVOUS, ANXIOUS, OR ON EDGE: NEARLY EVERY DAY
6. BECOMING EASILY ANNOYED OR IRRITABLE: MORE THAN HALF THE DAYS
2. NOT BEING ABLE TO STOP OR CONTROL WORRYING: MORE THAN HALF THE DAYS
GAD7 TOTAL SCORE: 16
5. BEING SO RESTLESS THAT IT IS HARD TO SIT STILL: NEARLY EVERY DAY
3. WORRYING TOO MUCH ABOUT DIFFERENT THINGS: MORE THAN HALF THE DAYS
7. FEELING AFRAID AS IF SOMETHING AWFUL MIGHT HAPPEN: MORE THAN HALF THE DAYS
IF YOU CHECKED OFF ANY PROBLEMS ON THIS QUESTIONNAIRE, HOW DIFFICULT HAVE THESE PROBLEMS MADE IT FOR YOU TO DO YOUR WORK, TAKE CARE OF THINGS AT HOME, OR GET ALONG WITH OTHER PEOPLE: VERY DIFFICULT

## 2025-08-12 ASSESSMENT — PATIENT HEALTH QUESTIONNAIRE - PHQ9: 5. POOR APPETITE OR OVEREATING: MORE THAN HALF THE DAYS

## 2025-08-13 ENCOUNTER — PATIENT OUTREACH (OUTPATIENT)
Dept: CARE COORDINATION | Facility: CLINIC | Age: 35
End: 2025-08-13
Payer: COMMERCIAL

## 2025-08-21 ASSESSMENT — ANXIETY QUESTIONNAIRES
4. TROUBLE RELAXING: MORE THAN HALF THE DAYS
IF YOU CHECKED OFF ANY PROBLEMS ON THIS QUESTIONNAIRE, HOW DIFFICULT HAVE THESE PROBLEMS MADE IT FOR YOU TO DO YOUR WORK, TAKE CARE OF THINGS AT HOME, OR GET ALONG WITH OTHER PEOPLE: VERY DIFFICULT
6. BECOMING EASILY ANNOYED OR IRRITABLE: SEVERAL DAYS
7. FEELING AFRAID AS IF SOMETHING AWFUL MIGHT HAPPEN: MORE THAN HALF THE DAYS
5. BEING SO RESTLESS THAT IT IS HARD TO SIT STILL: MORE THAN HALF THE DAYS
3. WORRYING TOO MUCH ABOUT DIFFERENT THINGS: MORE THAN HALF THE DAYS
GAD7 TOTAL SCORE: 13
GAD7 TOTAL SCORE: 13
7. FEELING AFRAID AS IF SOMETHING AWFUL MIGHT HAPPEN: MORE THAN HALF THE DAYS
1. FEELING NERVOUS, ANXIOUS, OR ON EDGE: MORE THAN HALF THE DAYS
8. IF YOU CHECKED OFF ANY PROBLEMS, HOW DIFFICULT HAVE THESE MADE IT FOR YOU TO DO YOUR WORK, TAKE CARE OF THINGS AT HOME, OR GET ALONG WITH OTHER PEOPLE?: VERY DIFFICULT
2. NOT BEING ABLE TO STOP OR CONTROL WORRYING: MORE THAN HALF THE DAYS
GAD7 TOTAL SCORE: 13

## 2025-08-25 ENCOUNTER — VIRTUAL VISIT (OUTPATIENT)
Facility: CLINIC | Age: 35
End: 2025-08-25
Payer: COMMERCIAL

## 2025-08-25 DIAGNOSIS — F43.23 ADJUSTMENT DISORDER WITH MIXED ANXIETY AND DEPRESSED MOOD: Primary | ICD-10-CM

## 2025-08-25 PROBLEM — F41.1 GENERALIZED ANXIETY DISORDER: Status: ACTIVE | Noted: 2025-08-25

## 2025-08-25 PROCEDURE — 90791 PSYCH DIAGNOSTIC EVALUATION: CPT | Mod: 52

## (undated) DEVICE — SU VICRYL+ 4-0 UNDYED PS-2 VCP496ZH

## (undated) DEVICE — ENDO TROCAR SLEEVE KII Z-THREADED 05X100MM CTS02

## (undated) DEVICE — CLIP LIGACLIP LG YELLOW LT400

## (undated) DEVICE — ENDO TROCAR FIRST ENTRY KII FIOS Z-THRD 11X100MM CTF33

## (undated) DEVICE — ENDO SHEARS RENEW LAP ENDOCUT SCISSOR TIP 16.5MM 3142

## (undated) DEVICE — Device

## (undated) DEVICE — TUBING SMOKE EVAC PNEUMOCLEAR HIGH FLOW 0620050250

## (undated) DEVICE — TUBING SUCTION MEDI-VAC 1/4"X20' N620A

## (undated) DEVICE — SYR 30ML LL W/O NDL 302832

## (undated) DEVICE — SU VICRYL+ 0 27 UR6 VLT VCP603H

## (undated) DEVICE — ESU GROUND PAD ADULT REM W/15' CORD E7507DB

## (undated) DEVICE — SOL WATER IRRIG 1000ML BOTTLE 2F7114

## (undated) DEVICE — GLOVE BIOGEL PI SZ 8.0 40880

## (undated) DEVICE — PREP CHLORAPREP 26ML TINTED HI-LITE ORANGE 930815

## (undated) DEVICE — ENDO TROCAR FIRST ENTRY KII FIOS Z-THRD 05X100MM CTF03

## (undated) DEVICE — DECANTER VIAL 2006S

## (undated) DEVICE — CUSTOM PACK LAP CHOLE SBA5BLCHEA

## (undated) RX ORDER — PROPOFOL 10 MG/ML
INJECTION, EMULSION INTRAVENOUS
Status: DISPENSED
Start: 2024-11-07

## (undated) RX ORDER — ONDANSETRON 2 MG/ML
INJECTION INTRAMUSCULAR; INTRAVENOUS
Status: DISPENSED
Start: 2024-11-07

## (undated) RX ORDER — FENTANYL CITRATE 50 UG/ML
INJECTION, SOLUTION INTRAMUSCULAR; INTRAVENOUS
Status: DISPENSED
Start: 2024-11-07

## (undated) RX ORDER — BUPIVACAINE HYDROCHLORIDE 2.5 MG/ML
INJECTION, SOLUTION EPIDURAL; INFILTRATION; INTRACAUDAL
Status: DISPENSED
Start: 2024-11-07

## (undated) RX ORDER — DEXAMETHASONE SODIUM PHOSPHATE 10 MG/ML
INJECTION, SOLUTION INTRAMUSCULAR; INTRAVENOUS
Status: DISPENSED
Start: 2024-11-07

## (undated) RX ORDER — GLYCOPYRROLATE 0.2 MG/ML
INJECTION, SOLUTION INTRAMUSCULAR; INTRAVENOUS
Status: DISPENSED
Start: 2024-11-07

## (undated) RX ORDER — LIDOCAINE HYDROCHLORIDE 10 MG/ML
INJECTION, SOLUTION EPIDURAL; INFILTRATION; INTRACAUDAL; PERINEURAL
Status: DISPENSED
Start: 2024-11-07